# Patient Record
Sex: MALE | Race: WHITE | NOT HISPANIC OR LATINO | Employment: FULL TIME | URBAN - METROPOLITAN AREA
[De-identification: names, ages, dates, MRNs, and addresses within clinical notes are randomized per-mention and may not be internally consistent; named-entity substitution may affect disease eponyms.]

---

## 2017-08-21 ENCOUNTER — OFFICE VISIT (OUTPATIENT)
Dept: FAMILY MEDICINE | Facility: CLINIC | Age: 57
End: 2017-08-21

## 2017-08-21 VITALS
DIASTOLIC BLOOD PRESSURE: 89 MMHG | HEART RATE: 70 BPM | WEIGHT: 229.2 LBS | HEIGHT: 74 IN | TEMPERATURE: 97.9 F | BODY MASS INDEX: 29.41 KG/M2 | SYSTOLIC BLOOD PRESSURE: 135 MMHG | OXYGEN SATURATION: 99 %

## 2017-08-21 DIAGNOSIS — Z00.00 ROUTINE GENERAL MEDICAL EXAMINATION AT A HEALTH CARE FACILITY: Primary | ICD-10-CM

## 2017-08-21 LAB
CHOLEST SERPL-MCNC: 243 MG/DL
CHOLEST/HDLC SERPL: 3.5 RATIO
HDLC SERPL-MCNC: 70 MG/DL
LDLC SERPL CALC-MCNC: 136 MG/DL (ref 0–99)
TRIGL SERPL-MCNC: 184 MG/DL
VLDL-CHOLESTEROL: 37 MG/DL (ref 7–32)

## 2017-08-21 NOTE — MR AVS SNAPSHOT
"              After Visit Summary   8/21/2017    Joe Copeland    MRN: [id number]           Patient Information     Date Of Birth          1960        Visit Information        Provider Department      8/21/2017 8:00 AM Daljit Ryan MD [facility]        Today's Diagnoses     Routine general medical examination at a health care facility    -  1       Follow-ups after your visit        Who to contact     Please call your clinic at [phone] to:    Ask questions about your health    Make or cancel appointments    Discuss your medicines    Learn about your test results    Speak to your doctor   If you have compliments or concerns about an experience at your clinic, or if you wish to file a complaint, please contact [facility] Patient Relations at [phone] or email us at [email or web address]         Additional Information About Your Visit        Care EveryWhere ID     This is your Care EveryWhere ID. This could be used by other organizations to access your [place] medical records  [id number]        Your Vitals Were     Pulse Temperature Height Pulse Oximetry BMI (Body Mass Index)       70 97.9  F (36.6  C) (Oral) 6' 2.25\" (188.6 cm) 99% 29.23 kg/m2        Blood Pressure from Last 3 Encounters:   08/21/17 135/89   09/28/16 137/90    Weight from Last 3 Encounters:   08/21/17 229 lb 3.2 oz (104 kg)   09/28/16 229 lb 3.2 oz (104 kg)   03/06/12 220 lb (99.8 kg)              We Performed the Following     [facility] - Results < 1 hr        Primary Care Provider Office Phone # Fax #    Daljit Ryan [phone]       [facility] [address] [facility] [address]        Equal Access to Services     [facility] HospitalSHARIF AH: Hadii aad brianna acharya Sogeovanna, waaxda luqadaha, qaybta kaalmada naomyyada, laurie cordova. So [facility] [phone].    ATENCIÓN: Si habla español, tiene a gar disposición servicios " gilbert de asistencia lingüística. Alicia ray [phone].    We comply with applicable federal civil rights laws and [place] laws. We do not discriminate on the basis of race, color, national origin, age, disability sex, sexual orientation or gender identity.            Thank you!     Thank you for choosing [facility]  for your care. Our goal is always to provide you with excellent care. Hearing back from our patients is one way we can continue to improve our services. Please take a few minutes to complete the written survey that you may receive in the mail after your visit with us. Thank you!             Your Updated Medication List - Protect others around you: Learn how to safely use, store and throw away your medicines at www.disposemymeds.org.          This list is accurate as of: 8/21/17  7:34 PM.  Always use your most recent med list.                   Brand Name Dispense Instructions for use Diagnosis    ADVIL 200 MG tablet   Generic drug:  ibuprofen      Take 600 mg by mouth as needed.        cetirizine 10 MG tablet    zyrTEC    90 tablet    Take 1 tablet (10 mg) by mouth every evening    Seasonal allergic rhinitis       fluticasone 50 MCG/ACT spray    FLONASE    1 Bottle    Spray 1-2 sprays into both nostrils daily    Seasonal allergic rhinitis       omeprazole 20 MG CR capsule    priLOSEC    90 capsule    Take 1 capsule (20 mg) by mouth daily    Gastroesophageal reflux disease without esophagitis

## 2017-08-21 NOTE — PROGRESS NOTES
PREVENTIVE HEALTH VISIT   HEALTH CONCERNS TODAY:   1.  Gastroesophageal reflux disease.   2.  Mole.   3.  Vascular risk assessment.   HISTORY OF PRESENT ILLNESS:  This is a 56-year-old male who presents for health maintenance.  As far as his reflux he has been using omeprazole on a daily basis.  With the use of omeprazole he has not had heartburn or a hoarse voice that was bothering him in the past before he started the medication.  He has tried stopping the medication for brief periods over the past couple months to find that his symptoms returned.  He has not had blood in stool, black stool or other symptoms.  No difficulty with swallowing or food getting stuck.   He has a mole on his left low back and behind his right triceps which he would like evaluated.   REVIEW OF SYSTEMS:  No fevers or chills.  His weight is stable.  He feels well.  He has occasional stuffy nose or seasonal allergies and mild sensation of plugging in his right ear which is improving.  No vision or other urinary complaints.  No chest pain, palpitations, no dizziness, lightheadedness, no shortness of breath, no change in his exercise tolerance.  No abdominal pain.  No change in stools, no change in urination.  Skin;  Mole concerns as outlined above.  He takes minocycline through a dermatologist for rosacea.  The remainder of a complete review of systems is unremarkable.   PAST MEDICAL HISTORY:  None significant.     PAST SURGICAL HISTORY:  None.   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  He is  to Tere.  He has 2 children, Mildred, who is 24 and Danny who is 21.  Mildred is now law school at [employer].  He does not smoke or use drugs.  He exercises regularly.   FAMILY HISTORY:  His mother  at age 85 what he thinks may have been kidney cancer.  She had hypertension.  His father  at age 56 of leukemia.  He is not sure what type.  There is no family history of heart disease, lung disease,  diabetes, colon cancer, prostate cancer, breast cancer.   PHYSICAL EXAMINATION:   VITAL SIGNS:  As above.  Repeat blood pressure is 136/86.   GENERAL:  He is alert, no distress, relates his own history in detail.   HEENT:  Head is normocephalic and atraumatic.  Eyes, sclerae are nonicteric, noninjected.  Extraocular movements are intact.  Tympanic membranes are white.  The right TM has a small effusion at the base, bony and light landmarks are intact.   NECK:  Supple without lymphadenopathy.  Nose exam is unremarkable.  No tonsillar hypertrophy or exudate.   CARDIOVASCULAR:  He has a regular rate and rhythm without murmur.   LUNGS:  Clear to auscultation bilaterally.   ABDOMEN:  Soft, nontender.  There is no hepatosplenomegaly.   EXTREMITIES:  Free of edema.   SKIN:  He has mild central face without erythema.  He has a 1.5 cm stuck-on, light brown appearing lesion at the left low back consistent with seborrheic keratoses.  He has an approximately 1 cm light brown, stuck-on appearing lesion at the left mid triceps consistent with seborrheic keratoses and he has multiple cherry angiomas throughout the back and chest.   ASSESSMENT AND PLAN:   1.  Gastroesophageal reflux disease:  He can continue omeprazole on a daily basis.  He has had return of symptoms with discontinuation, so I would just continue for the next one year, then re-evaluate.  We will try discontinuing after one year.  He knows to avoid NSAIDS, large meals, and other habits which may contribute to reflux.   2.  Seborrheic keratosis:  This may be removed if it becomes bothersome or catches on clothing.  Otherwise, it is benign appearing.  He also follows with the dermatologist.   3.  Rosacea:  He follows with a dermatologist and has an appointment coming up next week.   4.  Health care maintenance:  Lipid profile is pending.  We will calculate 10-year vascular risk when results are available.  We had a colonoscopy about 5 years ago with [facility] in  [place].  He will have a repeat at age 61.  He had a cardiac stress test at [place] 1 year ago.  He has not had any cardiac symptoms over the past year.     10 year AHA risk 5.8%.  Statin and aspirin for prevention not indicated.

## 2017-08-21 NOTE — LETTER
August 23, 2017      Joe Copeland  [address] [place] [address]        Dear Joe,    Your cholesterol is at goal level.  No indication for cholesterol medication or a daily preventative aspirin at this point.     Please see below for your test results.    Resulted Orders   Lipid Panel ([facility]) - Results < 1 hr   Result Value Ref Range    Cholesterol 243.0 (H) <200.0 mg/dL    Triglycerides 184.0 (H) <150.0 mg/dL    HDL Cholesterol 70.0 >40.0 mg/dL      Comment:      If diabetic or CVD then reference range <100    VLDL-Cholesterol 37.0 (H) 7.0 - 32.0 mg/dL    LDL Cholesterol Direct 136.0 (H) 0.0 - 99.0 mg/dL    Cholesterol/HDL Ratio 3.5 <5.0 RATIO       If you have any questions, please call the clinic to make an appointment.    Sincerely,    Daljit Ryan MD

## 2017-08-22 NOTE — PROGRESS NOTES
Please send result letter  Your cholesterol is at goal level.  No indication for cholesterol medication or a daily preventative aspirin at this point.

## 2017-10-16 ENCOUNTER — OFFICE VISIT (OUTPATIENT)
Dept: FAMILY MEDICINE | Facility: CLINIC | Age: 57
End: 2017-10-16

## 2017-10-16 VITALS
HEIGHT: 75 IN | HEART RATE: 62 BPM | BODY MASS INDEX: 28.12 KG/M2 | WEIGHT: 226.2 LBS | SYSTOLIC BLOOD PRESSURE: 129 MMHG | DIASTOLIC BLOOD PRESSURE: 83 MMHG | OXYGEN SATURATION: 98 % | TEMPERATURE: 99 F

## 2017-10-16 DIAGNOSIS — Z23 NEED FOR PROPHYLACTIC VACCINATION AND INOCULATION AGAINST INFLUENZA: ICD-10-CM

## 2017-10-16 DIAGNOSIS — B34.9 VIRAL SYNDROME: Primary | ICD-10-CM

## 2017-10-16 DIAGNOSIS — R05.9 COUGH: ICD-10-CM

## 2017-10-16 DIAGNOSIS — H10.33 ACUTE CONJUNCTIVITIS OF BOTH EYES, UNSPECIFIED ACUTE CONJUNCTIVITIS TYPE: ICD-10-CM

## 2017-10-16 DIAGNOSIS — R53.83 FATIGUE, UNSPECIFIED TYPE: ICD-10-CM

## 2017-10-16 LAB
% GRANULOCYTES: 78.6 %G (ref 40–75)
GRANULOCYTES #: 10.6 K/UL (ref 1.6–8.3)
HCT VFR BLD AUTO: 46.3 % (ref 40–53)
HEMOGLOBIN: 14.7 G/DL (ref 13.3–17.7)
LYMPHOCYTES # BLD AUTO: 2 K/UL (ref 0.8–5.3)
LYMPHOCYTES NFR BLD AUTO: 14.7 %L (ref 20–48)
MCH RBC QN AUTO: 29.8 PG (ref 26.5–35)
MCHC RBC AUTO-ENTMCNC: 31.7 G/DL (ref 32–36)
MCV RBC AUTO: 93.9 FL (ref 78–100)
MID #: 0.9 K/UL (ref 0–2.2)
MID %: 6.7 %M (ref 0–20)
PLATELET # BLD AUTO: 322 K/UL (ref 150–450)
RBC # BLD AUTO: 4.93 M/UL (ref 4.4–5.9)
WBC # BLD AUTO: 13.5 K/UL (ref 4–11)

## 2017-10-16 RX ORDER — POLYMYXIN B SULFATE AND TRIMETHOPRIM 1; 10000 MG/ML; [USP'U]/ML
1 SOLUTION OPHTHALMIC 4 TIMES DAILY
Qty: 2 ML | Refills: 0 | Status: SHIPPED | OUTPATIENT
Start: 2017-10-16 | End: 2017-10-23

## 2017-10-16 NOTE — PROGRESS NOTES
Please send result letter  As we discussed by phone, your white blood cell count is somewhat elevated due to inflammation/infection.  Your white blood cell count level does not show any sign of a blood cell production problems or any problems fighting infection.  Your chest x-ray was normal.  I suspect your infection is viral, possibilities include adenovirus, influenza, parainfluenza; unfortunately there is no quick cure for any of these, and I anticipate your symptoms will be significantly better in 7 days, and your cough to be fully resolved in 3 weeks.  If you are not getting better as expected, or develop new symptoms, please feel free to call me.

## 2017-10-16 NOTE — LETTER
October 17, 2017      Joe Copeland  [address] [place] [address]        Dear Joe,    As we discussed by phone, your white blood cell count is somewhat elevated due to inflammation/infection.  Your white blood cell count level does not show any sign of a blood cell production problems or any problems fighting infection.  Your chest x-ray was normal.  I suspect your infection is viral, possibilities include adenovirus, influenza, parainfluenza; unfortunately there is no quick cure for any of these, and I anticipate your symptoms will be significantly better in 7 days, and your cough to be fully resolved in 3 weeks.  If you are not getting better as expected, or develop new symptoms, please feel free to call me.     Please see below for your test results.    Resulted Orders   CBC with Diff Plt ([facility])   Result Value Ref Range    WBC 13.5 (H) 4.0 - 11.0 K/uL    Lymphocytes # 2.0 0.8 - 5.3 K/uL    % Lymphocytes 14.7 (L) 20.0 - 48.0 %L    Mid # 0.9 0.0 - 2.2 K/uL    Mid % 6.7 0.0 - 20.0 %M    GRANULOCYTES # 10.6 (H) 1.6 - 8.3 K/uL    % Granulocytes 78.6 (H) 40.0 - 75.0 %G    RBC 4.93 4.40 - 5.90 M/uL    Hemoglobin 14.7 13.3 - 17.7 g/dL    Hematocrit 46.3 40.0 - 53.0 %    MCV 93.9 78.0 - 100.0 fL    MCH 29.8 26.5 - 35.0 pg    MCHC 31.7 (L) 32.0 - 36.0 g/dL    Platelets 322.0 150.0 - 450.0 K/uL       If you have any questions, please call the clinic to make an appointment.    Sincerely,    Daljit Ryan MD

## 2017-12-14 DIAGNOSIS — K21.9 GASTROESOPHAGEAL REFLUX DISEASE WITHOUT ESOPHAGITIS: ICD-10-CM

## 2018-08-06 ENCOUNTER — TRANSFERRED RECORDS (OUTPATIENT)
Dept: HEALTH INFORMATION MANAGEMENT | Facility: CLINIC | Age: 58
End: 2018-08-06

## 2018-08-06 ENCOUNTER — OFFICE VISIT (OUTPATIENT)
Dept: FAMILY MEDICINE | Facility: CLINIC | Age: 58
End: 2018-08-06
Payer: COMMERCIAL

## 2018-08-06 VITALS
OXYGEN SATURATION: 98 % | HEIGHT: 74 IN | HEART RATE: 79 BPM | SYSTOLIC BLOOD PRESSURE: 123 MMHG | TEMPERATURE: 98.1 F | BODY MASS INDEX: 28.83 KG/M2 | WEIGHT: 224.6 LBS | DIASTOLIC BLOOD PRESSURE: 84 MMHG

## 2018-08-06 DIAGNOSIS — R00.2 PALPITATIONS: ICD-10-CM

## 2018-08-06 DIAGNOSIS — R53.83 FATIGUE, UNSPECIFIED TYPE: ICD-10-CM

## 2018-08-06 DIAGNOSIS — I48.4 ATYPICAL ATRIAL FLUTTER (H): Primary | ICD-10-CM

## 2018-08-06 NOTE — MR AVS SNAPSHOT
"              After Visit Summary   8/6/2018    Joe Copeland    MRN: [id number]           Patient Information     Date Of Birth          1960        Visit Information        Provider Department      8/6/2018 11:20 AM Daljit Ryan MD [facility]        Today's Diagnoses     Atypical atrial flutter (H)    -  1    Palpitations        Fatigue, unspecified type           Follow-ups after your visit        Who to contact     Please call your clinic at [phone] to:    Ask questions about your health    Make or cancel appointments    Discuss your medicines    Learn about your test results    Speak to your doctor            Additional Information About Your Visit        Care EveryWhere ID     This is your Care EveryWhere ID. This could be used by other organizations to access your [place] medical records  [id number]        Your Vitals Were     Pulse Temperature Height Pulse Oximetry BMI (Body Mass Index)       79 98.1  F (36.7  C) (Oral) 6' 2.41\" (189 cm) 98% 28.52 kg/m2        Blood Pressure from Last 3 Encounters:   08/06/18 123/84   10/16/17 129/83   08/21/17 135/89    Weight from Last 3 Encounters:   08/06/18 224 lb 9.6 oz (101.9 kg)   10/16/17 226 lb 3.2 oz (102.6 kg)   08/21/17 229 lb 3.2 oz (104 kg)              We Performed the Following     EKG 12-lead complete w/read - Clinics          Today's Medication Changes          These changes are accurate as of 8/6/18 10:41 PM.  If you have any questions, ask your nurse or doctor.               Stop taking these medicines if you haven't already. Please contact your care team if you have questions.     omeprazole 20 MG CR capsule   Commonly known as:  priLOSEC   Stopped by:  Daljit Ryan MD                    Primary Care Provider Office Phone # Fax #    Daljit Ryan [phone]       [facility] [id number]        Equal Access to Services     JAQUAN SHRESTHA AH: sheela Power, " sidraanette begumyaniramarion moralezkristineroger schultzke maxjose a cordova. So [facility] [phone].    ATENCIÓN: Si brenda witt, tiene a gar disposición servicios gratuitos de asistencia lingüística. lAicia al [phone].    We comply with applicable federal civil rights laws and [place] laws. We do not discriminate on the basis of race, color, national origin, age, disability, sex, sexual orientation, or gender identity.            Thank you!     Thank you for choosing [facility]  for your care. Our goal is always to provide you with excellent care. Hearing back from our patients is one way we can continue to improve our services. Please take a few minutes to complete the written survey that you may receive in the mail after your visit with us. Thank you!             Your Updated Medication List - Protect others around you: Learn how to safely use, store and throw away your medicines at www.disposemymeds.org.          This list is accurate as of 8/6/18 10:41 PM.  Always use your most recent med list.                   Brand Name Dispense Instructions for use Diagnosis    ADVIL 200 MG tablet   Generic drug:  ibuprofen      Take 600 mg by mouth as needed.        cetirizine 10 MG tablet    zyrTEC    90 tablet    Take 1 tablet (10 mg) by mouth every evening    Seasonal allergic rhinitis       fluticasone 50 MCG/ACT spray    FLONASE    1 Bottle    Spray 1-2 sprays into both nostrils daily    Seasonal allergic rhinitis

## 2018-08-07 NOTE — PROGRESS NOTES
"Subjective: 57-year-old male presents with episodes of heart racing, pressure, and dyspnea.  He is not having symptoms right now.  He cannot recall when the symptoms first started.  He can recall a number of years ago having similar symptoms and being evaluated with a cardiac stress test at [facility].  The cardiac stress test was reported to be normal.      Now sometime over the past few months he has developed episodes where he will have the same sensation of a racing heart, feeling pressure sensation, feeling short of breath.  \"I think I am having a heart attack\".  He cannot recall the frequency with which it happens but probably on the order of a couple times per  month.  The symptoms typically last half hour or less.  Do not do do not seem to change with exertion up.  They seem better if I \"ignore them and just go about my business\".  He was bicycling about 2 weeks ago and felt so fatigued that he actually called his wife for a ride home.  He has never done that before.  He felt fine by the time he got picked up by his wife.  He has been attributing these symptoms to stress.  He transitioned to a new job recently.  His brother  in May.  His wife's father and brother  recently.  His 23-year-old sons best friend  at age 23 of suicide 2 weeks ago.  He went for a 3-1/2 hour mountain bike ride yesterday and did not have any symptoms during the ride.    Review of systems: Currently not short of breath, no chest pain.  No orthopnea.  No leg swelling.  He has felt in general fatigued over the past month to month and a half.  He recalls having one dizzy episode as he was walking into a event that spontaneously resolved.  He has occasional muscle aches particularly in bilateral biceps and legs.  He occasionally wakes up at night.  He is stressed recently with all the life stressors in the recent deaths.  He occasionally gets a tingling sensation in his left wrist after biking for prolonged periods of " time    Past medical history:  Negative cardiac stress test approximately 2 years ago at [facility] per patient report    Social history: Non-smoker.  Occasional alcohol.  .    Exam: Vitals are as above except heart rate is about 130 on my exam  General: He is alert and relates his own history  HEENT: Head is normocephalic.  Sclera nonicteric.  Moist mucous membranes  Cardiovascular: Heart tones are irregular with a heart rate of about 130 he has a strong bilateral radial pulses  No jugular venous distention  Respiratory: Lungs are clear to station bilaterally  Abdomen: Soft nontender  Extremities Free of edema    EKG: Shows atrial flutter with a heart rate of 131.  There is some slight upsloping of the ST segments in the lateral leads otherwise no ST or T-wave changes.    Assessment and plan  1) atrial flutter: This may be intermittent and his symptoms of palpitations fatigue and dyspnea on and off over the past number of months may be due to this arrhythmia.  He has good functional capacity, fairly recent negative cardiac stress test, but I recommend a prompt evaluation.  I referred him to the emergency department of his choice [facility].  I anticipate he will have an etiology workup including a ischemic workup, rate control, and anticoagulation of.  They will likely discuss cardioversion with him.  I called and spoke with the accepting emergency department physician at [place].  As he was asymptomatic and stable he was transported by his wife to the emergency department and I confirmed his safe arrival and admission to the hospital.  2) significant life stressors: We discussed the need for some healthy coping strategies as he navigates is very stressful time in his life which we will further discuss after managing his acute cardiac arrhythmia.

## 2018-08-08 ENCOUNTER — TRANSFERRED RECORDS (OUTPATIENT)
Dept: HEALTH INFORMATION MANAGEMENT | Facility: CLINIC | Age: 58
End: 2018-08-08

## 2018-08-31 ENCOUNTER — TRANSFERRED RECORDS (OUTPATIENT)
Dept: HEALTH INFORMATION MANAGEMENT | Facility: CLINIC | Age: 58
End: 2018-08-31

## 2019-01-09 ENCOUNTER — TELEPHONE (OUTPATIENT)
Dept: FAMILY MEDICINE | Facility: CLINIC | Age: 59
End: 2019-01-09

## 2019-01-09 DIAGNOSIS — Z23 NEED FOR PROPHYLACTIC VACCINATION WITH TYPHOID-PARATYPHOID ALONE (TAB): ICD-10-CM

## 2019-01-09 DIAGNOSIS — Z71.84 COUNSELING ABOUT TRAVEL: Primary | ICD-10-CM

## 2019-01-09 NOTE — TELEPHONE ENCOUNTER
Called patient, He is scheduled for a nurse visit on Monday 1/14/2019 at 10:00 but will be coming in around 9:30AM. Pt will be getting Tdap, Hep A, Hep B, and influenza.

## 2019-01-09 NOTE — TELEPHONE ENCOUNTER
Patient called to report traveling to [place] in early February 2019, would like immunization advice.    I advised patient to review [facility] website to find immunization and travel/health recommendations for his area of planned travel.    I recommend he get a Tdap booster, influenza vaccine, start the hepatitis A and hepatitis B vaccine series.    Recommend oral typhoid vaccine (vivotif), and a prescription was sent to his pharmacy via EMR.    Patient reports the malaria risk in his planned area of travel as low, and that he is not planning camping, outdoor adventures, etc.  He declines malaria chemoprohylaxis after reviewing the [facility] website and our discussion.    I recommend he get the shingles vaccine, Shingrix, following his travel.    I will ask MA staff to contact patient to contact patient to give advice on how best to get these vaccines and arrange a vaccine appointment for TdaP, Hepatitis A, Hepatitis B, and influenza vaccine prior to travel.  This can be done at clinic, pharmacy, or combonation.    Oral typhoid vaccine has been sent to patient's pharmacy.    Message routed to Norma Ryan

## 2019-01-14 ENCOUNTER — ALLIED HEALTH/NURSE VISIT (OUTPATIENT)
Dept: FAMILY MEDICINE | Facility: CLINIC | Age: 59
End: 2019-01-14
Payer: COMMERCIAL

## 2019-01-14 VITALS
HEART RATE: 68 BPM | HEIGHT: 75 IN | WEIGHT: 227.25 LBS | DIASTOLIC BLOOD PRESSURE: 91 MMHG | BODY MASS INDEX: 28.26 KG/M2 | TEMPERATURE: 97.5 F | OXYGEN SATURATION: 100 % | SYSTOLIC BLOOD PRESSURE: 136 MMHG

## 2019-01-14 DIAGNOSIS — Z23 IMMUNIZATION DUE: Primary | ICD-10-CM

## 2019-01-14 ASSESSMENT — MIFFLIN-ST. JEOR: SCORE: 1944.55

## 2019-01-14 NOTE — NURSING NOTE
Injectable influenza vaccine documentation    1. Has the patient received the information for the influenza vaccine? YES    2. Does the patient have a severe allergy to eggs (Patients with a severe egg allergy should be assessed by a medical provider, RN, or clinical pharmacist. If they receive the influenza vaccine, please have them observed for 15 minutes.)? No    3. Has the patient had an allergic reaction to previous influenza vaccines? No    4. Has the patient had any severe allergic reactions to past influenza vaccines ? No       5. Does patient have a history of Guillain-[place] syndrome? No      Based on responses above, I administered the influenza vaccine.  Tk Sheikh CMA   Name ordering is DR Ryan  Name preceptor on site during the time of injection is DR Ryan    Vaccine verify with Norma Hicks CMA

## 2019-01-16 ENCOUNTER — TELEPHONE (OUTPATIENT)
Dept: FAMILY MEDICINE | Facility: CLINIC | Age: 59
End: 2019-01-16

## 2019-01-16 NOTE — TELEPHONE ENCOUNTER
Patient called and informed to continue to monitor blood pressure twice weekly with home readings.    See me in clinic in 4 weeks  if BP consisently above 140/90.    Reduce salt diet, regular exercise, reduce alcohol and caffeine.    BEATRIZ Ryan MD

## 2019-01-16 NOTE — TELEPHONE ENCOUNTER
----- Message from Tk Sheikh CMA sent at 1/14/2019 10:21 AM CST -----  Hi DR Ryan,    This patient for here for immunization and his blood pressure.  #1 139/92  #2 136/92   Patient said you can call with his blood pressure result.    Thanks,  Tk

## 2019-03-15 ENCOUNTER — TELEPHONE (OUTPATIENT)
Dept: FAMILY MEDICINE | Facility: CLINIC | Age: 59
End: 2019-03-15

## 2019-03-15 DIAGNOSIS — L71.9 ROSACEA: Primary | ICD-10-CM

## 2019-03-15 RX ORDER — MINOCYCLINE HYDROCHLORIDE 50 MG/1
50 CAPSULE ORAL 2 TIMES DAILY
Qty: 60 CAPSULE | Refills: 0 | Status: SHIPPED | OUTPATIENT
Start: 2019-03-15 | End: 2019-08-02

## 2019-03-16 NOTE — TELEPHONE ENCOUNTER
Patient usual routine for rosacea is minocycline 50mg BID.  Reviewed risk of sun sensitivity, GI upset, and more serious reactions like skin reactions, lupus like reactions, and allergic reactions.    Recommend minocycline 50mg BID until current flare improves, (next 10-14 days) then transition to metro gel 0.75%.topically daily. Discussed risk of skin irritation, allergic reaction.    Rx sent to pharmacy.    F/U in clinic if not improving in 2 weeks, otherwise f/u for annual physical at next convenience

## 2019-05-01 ENCOUNTER — ALLIED HEALTH/NURSE VISIT (OUTPATIENT)
Dept: FAMILY MEDICINE | Facility: CLINIC | Age: 59
End: 2019-05-01
Payer: COMMERCIAL

## 2019-05-01 VITALS
DIASTOLIC BLOOD PRESSURE: 99 MMHG | HEIGHT: 74 IN | TEMPERATURE: 98.1 F | WEIGHT: 226.5 LBS | SYSTOLIC BLOOD PRESSURE: 151 MMHG | OXYGEN SATURATION: 97 % | BODY MASS INDEX: 29.07 KG/M2 | HEART RATE: 64 BPM

## 2019-05-01 DIAGNOSIS — Z23 IMMUNIZATION DUE: Primary | ICD-10-CM

## 2019-05-01 ASSESSMENT — MIFFLIN-ST. JEOR: SCORE: 1922.4

## 2019-05-01 NOTE — NURSING NOTE
Here for Hep B #2   Blood pressure # 1 151/99 # 2 150/96 I did review these blood pressure result with DR Brito and was instructed by DR Brito for patient to schedule appointment with DR Ryan to recheck blood pressure.Patient stated that he is  going out of town and when he return home he will call and schedule appointment.

## 2019-08-02 DIAGNOSIS — L71.9 ROSACEA: ICD-10-CM

## 2019-08-05 RX ORDER — MINOCYCLINE HYDROCHLORIDE 50 MG/1
50 CAPSULE ORAL 2 TIMES DAILY
Qty: 60 CAPSULE | Refills: 3 | Status: SHIPPED | OUTPATIENT
Start: 2019-08-05 | End: 2020-10-16

## 2019-10-03 ENCOUNTER — OFFICE VISIT (OUTPATIENT)
Dept: FAMILY MEDICINE | Facility: CLINIC | Age: 59
End: 2019-10-03
Payer: COMMERCIAL

## 2019-10-03 VITALS
BODY MASS INDEX: 28.35 KG/M2 | OXYGEN SATURATION: 100 % | RESPIRATION RATE: 16 BRPM | TEMPERATURE: 98.6 F | HEART RATE: 73 BPM | DIASTOLIC BLOOD PRESSURE: 91 MMHG | HEIGHT: 75 IN | SYSTOLIC BLOOD PRESSURE: 148 MMHG | WEIGHT: 228 LBS

## 2019-10-03 DIAGNOSIS — J02.9 PHARYNGITIS, UNSPECIFIED ETIOLOGY: ICD-10-CM

## 2019-10-03 DIAGNOSIS — I48.0 PAROXYSMAL ATRIAL FIBRILLATION (H): ICD-10-CM

## 2019-10-03 DIAGNOSIS — J30.2 SEASONAL ALLERGIC RHINITIS, UNSPECIFIED TRIGGER: ICD-10-CM

## 2019-10-03 DIAGNOSIS — K21.9 GASTROESOPHAGEAL REFLUX DISEASE WITHOUT ESOPHAGITIS: ICD-10-CM

## 2019-10-03 DIAGNOSIS — Z13.1 SCREENING FOR DIABETES MELLITUS: ICD-10-CM

## 2019-10-03 DIAGNOSIS — R09.82 POST-NASAL DRIP: Primary | ICD-10-CM

## 2019-10-03 DIAGNOSIS — Z13.220 SCREENING FOR LIPID DISORDERS: ICD-10-CM

## 2019-10-03 LAB
CHOLEST SERPL-MCNC: 214 MG/DL
CHOLEST/HDLC SERPL: 3.5 RATIO
HBA1C MFR BLD: 5 % (ref 4.1–5.7)
HDLC SERPL-MCNC: 62 MG/DL
LDLC SERPL CALC-MCNC: 129 MG/DL (ref 0–99)
TRIGL SERPL-MCNC: 116 MG/DL
VLDL-CHOLESTEROL: 23 MG/DL (ref 7–32)

## 2019-10-03 RX ORDER — CETIRIZINE HYDROCHLORIDE 10 MG/1
10 TABLET ORAL EVERY EVENING
Qty: 90 TABLET | Refills: 1 | Status: SHIPPED | OUTPATIENT
Start: 2019-10-03 | End: 2023-10-20

## 2019-10-03 RX ORDER — RIVAROXABAN 20 MG/1
TABLET, FILM COATED ORAL
COMMUNITY
Start: 2019-09-13 | End: 2019-11-28

## 2019-10-03 RX ORDER — SOTALOL HYDROCHLORIDE 80 MG/1
40 TABLET ORAL
COMMUNITY
Start: 2019-09-24 | End: 2019-11-25

## 2019-10-03 RX ORDER — FLUTICASONE PROPIONATE 50 MCG
1-2 SPRAY, SUSPENSION (ML) NASAL DAILY
Qty: 11.1 ML | Refills: 3 | Status: SHIPPED | OUTPATIENT
Start: 2019-10-03

## 2019-10-03 ASSESSMENT — MIFFLIN-ST. JEOR: SCORE: 1939.83

## 2019-10-03 NOTE — LETTER
"October 7, 2019      Joe Copeland  [address] [facility]  [place] [address]        Dear Joe,    Your screening test for diabetes was NORMAL.   Your cholesterol level is slightly improved compared to 2 years ago.  In order to help prevent heart disease and stroke, it is important to reduce your blood pressure to 130/80 (ideally under 120/80).  You can start by trying a \"DASH\" diet, which could help to significantly reduce your blood pressure (by about 10 points), reduce alcohol and caffeine, and continue your regular exercise.  You should seriously consider blood pressure medication if your blood pressure is not consistently 140/80 or less 6-8 weeks after making these changes.   Current guidelines recommend cholesterol medication for individuals with a 10 year vascular risk of 7.5% or more, your current risk (with elevated blood pressure) is 8.5%.  You should work to reduce your blood pressure, and can also consider a cholesterol medication.   Return to see Dr. Ryan in the next few weeks to recheck your blood pressure after making diet changes and discuss your options.     Please see below for your test results.    Resulted Orders   Lipid Panel ([facility]) - Results < 1 hr   Result Value Ref Range    Cholesterol 214.0 (H) <200.0 mg/dL    Triglycerides 116.0 <150.0 mg/dL    HDL Cholesterol 62.0 >40.0 mg/dL      Comment:      If diabetic or CVD then reference range <100    VLDL-Cholesterol 23.0 7.0 - 32.0 mg/dL    LDL Cholesterol Direct 129.0 (H) 0.0 - 99.0 mg/dL    Cholesterol/HDL Ratio 3.5 <5.0 RATIO   Hemoglobin A1c ([facility])   Result Value Ref Range    Hemoglobin A1C 5.0 4.1 - 5.7 %       If you have any questions, please call the clinic to make an appointment.    Sincerely,    Daljit Ryan MD  "

## 2019-10-03 NOTE — PATIENT INSTRUCTIONS
Take omeprazole 20 mg each day in the morning regularly for the next 4 weeks    Use the fluticasone nasal steroid 2 sprays to each nostril carefully as demonstrated in clinic each day for the next 4 weeks    Take Zyrtec allergy medication 10 mg each day for the next 4 weeks    If your sore throat, clearing throat, and cough have not resolved in the next 3 to 4 weeks please return for reevaluation    Remember to get your shingles vaccines through your pharmacy    Your cholesterol and diabetes screening test results will be available soon

## 2019-10-03 NOTE — PROGRESS NOTES
History  59-year-old male presents with:  2 months of sore throat, clear runny nose, intermittent hoarseness and 3 days of cough.  Starting in August he noticed sore throat that bothers him for about a week at a time, resolves for a few days then reoccurs.  No fevers or chills.  No ill contacts.  No difficulty with swallowing.  No pain with swallowing.   Nonproductive dry cough over the past 3 days.  He has not been using nasal steroids recently, he does use these during the spring allergy season.  He has intermittent heartburn symptoms, not currently using a PPI.     He has a history of paroxysmal and symptomatic atrial fibrillation/flutter.  He underwent  flutter ablation August 8, 2018.  He continues on sotalol 40 mg twice a day under the care of cardiology.  He has not had any symptomatic episodes or palpitations over the course of the summer.  He has been able to exercise vigorously and do mountain bike races without symptoms.    He has not been monitoring his blood pressure outside of the clinic.  Blood pressure was above goal his last 3 visits.        BP Readings from Last 3 Encounters:   10/03/19 (!) 148/91   05/01/19 (!) 151/99   01/14/19 (!) 136/91     Review of systems:  General: Weight is stable  HEENT: No visual changes.  No hearing changes  No facial pain  Cardiovascular: No palpitations, chest pain, racing heart  Respiratory: No shortness of breath  GI: No blood in stool or black stools  Endo: No polyuria, polydipsia  Skin: Has many moles, but none changing or symptomatic    Exam  Vitals are reviewed with blood pressure above goal  General: Alert no distress  HEENT: Head is neuropsych and Intermedic.  Sclera nonicteric noninjected.  Hent membranes right normal bony light landmarks.  Swollen inferior nasal turbinates with clear rhinorrhea.  No tenderness over the frontal and maxillary sinuses.  No tonsillar hypertrophy or exudate.  Mild erythema the posterior oropharynx.  Neck is free of  adenopathy  Cardiovascular: Regular rate and rhythm without murmur  Respiratory: Clear bilaterally  Abdomen is soft and nontender.  No epigastric tenderness  Extremities are free of edema  Skin: He has multiple cherry angiomas and seborrheic keratoses on the skin of his back, chest, and abdomen    Assessment and plan  1) chronic intermittent pharyngitis with symptoms of GERD and rhitinis: Differential diagnosis includes less likely gastroesophageal reflux, postnasal drip, allergic rhinitis, chronic rhinitis, and less likely infectious or other etiologies.    Trial of daily omeprazole, nasal steroid, and nonsedating antihistamine.  Avoid NSAIDs, alcohol, large meals, eating before bedtime, and other habits which exacerbate reflux.  If symptoms have not resolved within the next 3 weeks recommend reevaluation.  If symptoms resolve, recommend discontinuing treatment course after 4 weeks and monitor for recurrence.    2) Paroxysmal atrial fibrillation: Chads 2 Vasc score is O, but would be 1 due to hypertensive blood pressure readings. He is currently on Xarelto.  Follow-up with cardiology, as of last November plan was to sandy [employer] [place] or 14 day ACT monitor to determine if he is having episodes of a-fib.    3) high blood pressure readings: Recommend a 4-week trial of DASH diet, reduce caffeine, reduce alcohol.  Blood pressure not at goal of 130/80 or less recommend initiating antihypertensive treatment likely with lisinopril or losartan.    4) HCM:     Screening for diabetes with A1c normal at 5.0% today.  Lipid profile: Total Cholesterol 214, HDL 62,   10 year cardiac risk is 8.5%.  Recommend he consider daily statin medication.  He is due for repeat colonoscopy with [facility] at age 61.  Referred to his pharmacy for zoster vaccines and influenza  vaccine.

## 2019-10-10 ENCOUNTER — TELEPHONE (OUTPATIENT)
Dept: FAMILY MEDICINE | Facility: CLINIC | Age: 59
End: 2019-10-10

## 2019-10-10 DIAGNOSIS — H10.33 ACUTE CONJUNCTIVITIS OF BOTH EYES, UNSPECIFIED ACUTE CONJUNCTIVITIS TYPE: Primary | ICD-10-CM

## 2019-10-10 RX ORDER — OFLOXACIN 3 MG/ML
2 SOLUTION/ DROPS OPHTHALMIC 4 TIMES DAILY
Qty: 3 ML | Refills: 0 | Status: SHIPPED | OUTPATIENT
Start: 2019-10-10 | End: 2019-10-17

## 2019-10-10 NOTE — TELEPHONE ENCOUNTER
"Patient called with bilateral eye redness and discharge started this morning.  Had a scant amount of drainage past two mornings which wasn't bothersome, but this morning eyes became red and discharge noticeably worse.  Discharge is thin, yellow white mucus.  No vision change.   Looked up \"pink eye\" and feels he has pink eye.  Eyes \"irritated\"  but no pain.  No significant itch.  Able to open and use both eyes comfortably.   No vision changes or double vision.  No trauma to eyes or head.  No headache.  Wears contacts often, extended wear, monthly disposables.  Took his contacts out at noon today due to the redness discharge.    Has had eye irritation in the past from lake water, which included redness, but less discharge, lasting a couple days and resolving spontaneously.  Did swim briefly in a lake 5 days ago.    I discussed with the patient that a definitive diagnosis can only be made with an eye exam, and that there are some conditions which can become quickly vision threatening (keratitis), corneal ulceration. DDx includes these possibilities, as well as infection and allergic conjunctivitis.  As patient has no vision change, no eye pain, minimal itch, 3 days of progressive thin yellow discharge, viral or bacterial (non gonococcal) conjunctivitis most likely.  Patient defers an eye exam at this time, is open to starting fluroquinolone eye drops due to recent contact lens use and possibility of infection.  Ofloxacin 0.3% 2 drops to each eye four times daily x7 days  Stop contact lens use until eyes back to white and no discharge x24 hours.  Throw away current lenses and case.  See eye MD immediately if any vision change or pain, and if not improving by tomorrow afternoon.   Rx sent to pharmacy    BEATRIZ Ryan MD    "

## 2019-11-24 DIAGNOSIS — I10 BENIGN ESSENTIAL HYPERTENSION: Primary | ICD-10-CM

## 2019-11-25 ENCOUNTER — OFFICE VISIT (OUTPATIENT)
Dept: FAMILY MEDICINE | Facility: CLINIC | Age: 59
End: 2019-11-25
Payer: COMMERCIAL

## 2019-11-25 VITALS
OXYGEN SATURATION: 100 % | HEART RATE: 70 BPM | WEIGHT: 228.2 LBS | SYSTOLIC BLOOD PRESSURE: 145 MMHG | BODY MASS INDEX: 29.29 KG/M2 | DIASTOLIC BLOOD PRESSURE: 95 MMHG | TEMPERATURE: 97.6 F | RESPIRATION RATE: 14 BRPM | HEIGHT: 74 IN

## 2019-11-25 DIAGNOSIS — I10 BENIGN ESSENTIAL HYPERTENSION: ICD-10-CM

## 2019-11-25 LAB
BUN SERPL-MCNC: 12 MG/DL (ref 7–30)
CALCIUM SERPL-MCNC: 9.7 MG/DL (ref 8.5–10.4)
CHLORIDE SERPLBLD-SCNC: 102 MMOL/L (ref 94–109)
CO2 SERPL-SCNC: 27 MMOL/L (ref 20–32)
CREAT SERPL-MCNC: 0.8 MG/DL (ref 0.8–1.5)
EGFR CALCULATED (BLACK REFERENCE): >90 ML/MIN
EGFR CALCULATED (NON BLACK REFERENCE): >90 ML/MIN
GLUCOSE SERPL-MCNC: 108 MG/DL (ref 60–109)
POTASSIUM SERPL-SCNC: 4.2 MMOL/L (ref 3.4–5.3)
SODIUM SERPL-SCNC: 142 MMOL/L (ref 133–144)

## 2019-11-25 RX ORDER — SOTALOL HYDROCHLORIDE 80 MG/1
40 TABLET ORAL 2 TIMES DAILY
Qty: 90 TABLET | Refills: 3 | COMMUNITY
Start: 2019-11-25 | End: 2020-01-06

## 2019-11-25 RX ORDER — LISINOPRIL 10 MG/1
10 TABLET ORAL DAILY
Qty: 30 TABLET | Refills: 0 | Status: SHIPPED | OUTPATIENT
Start: 2019-11-25 | End: 2019-12-23

## 2019-11-25 ASSESSMENT — MIFFLIN-ST. JEOR: SCORE: 1931.35

## 2019-11-25 NOTE — PROGRESS NOTES
History    Stopped Xarelto 7 weeks ago under the guidance of cardiology, as he has been in sinus rhythm and no palpitations for at least 6 months.  Checks heart rhythm with phone radha most days to confirm sinus.    Blood pressure has been above goal past few visits, here and at cardiology.  Checks his blood pressure at home with systolics often in the 150s.  In an effort to reduce blood pressure has worked on reducing salt intake, increasing exercise (biking), reducing caffeine and alcohol to reduce blood pressure.  Goal of reducing weight 20 lbs over the course of the next year.    FHx: 1 brother with hypertension  Thinks mother had hypertension  Brother  of stroke, unknown underlying cause    Review of systems   General: No recent illness, No significant change in weight  HEENT: No vision change, or double vision.  No headaches.  No neck pain.  No dizziness or lightheadedness.  Cardiovascular: No chest pain or palpitations  No leg swelling  Respiratory: His dry cough has resolved after a course of omeprazole, and Nasonex.  No longer throat clearing.  Occasional transient loss of voice unchanged for greater than 10 years    Exam   vitals are reviewed with blood pressure above  goal  General: Alert and relates his own history  HEENT: Head is normocephalic and atraumatic.  Eyes sclera nonicteric noninjected.  Tympanic membranes are white with normal bony light landmarks.  Cardia vascular: Regular rate rhythm without murmur  Respiratory: Clear bilaterally  Extremities are free of edema    Assessment and plan  Hypertension: Initiate treatment with lisinopril 10 mg once daily.  We discussed the common side effects such as dry cough, and unlikely but serious potential side effect such as allergic reaction, and angioedema.  Patient consented to treatment.  Basic metabolic profile today and repeat blood pressure check and basic metabolic profile in about 3 weeks.  Goal blood pressure 130/80 or less.  We also discussed  diet modification, avoiding alcohol, and reducing caffeine along with goal weight loss of 20 pounds over the next 1 year.      Initiate Lisinopril 10mg once daily    F/U 3 weeks for BP check and BMP    Daljit Ryan MD

## 2019-11-25 NOTE — PATIENT INSTRUCTIONS
Start Lisinopril 10mg once daily to reduce blood pressure    Blood pressure goal 130/80 or less    Return to see Dr. Ryan in about 3 weeks for a repeat blood pressure check and kidney function/potassium level

## 2019-12-23 DIAGNOSIS — I10 BENIGN ESSENTIAL HYPERTENSION: ICD-10-CM

## 2019-12-23 RX ORDER — LISINOPRIL 10 MG/1
10 TABLET ORAL DAILY
Qty: 30 TABLET | Refills: 0 | Status: SHIPPED | OUTPATIENT
Start: 2019-12-23 | End: 2020-01-22

## 2019-12-23 NOTE — TELEPHONE ENCOUNTER
Patient request refill of Lisinopril 10mg daily.  No side effects reported.    Agree to follow up appointment for BMP and BP check in early January 2020.      Rx refilled.    Daljit Ryan MD

## 2020-01-06 ENCOUNTER — OFFICE VISIT (OUTPATIENT)
Dept: FAMILY MEDICINE | Facility: CLINIC | Age: 60
End: 2020-01-06
Payer: COMMERCIAL

## 2020-01-06 VITALS
WEIGHT: 234.3 LBS | SYSTOLIC BLOOD PRESSURE: 125 MMHG | BODY MASS INDEX: 28.53 KG/M2 | HEIGHT: 76 IN | TEMPERATURE: 97.7 F | DIASTOLIC BLOOD PRESSURE: 85 MMHG | OXYGEN SATURATION: 99 % | HEART RATE: 62 BPM | RESPIRATION RATE: 16 BRPM

## 2020-01-06 DIAGNOSIS — Z86.79 HISTORY OF ATRIAL FIBRILLATION: ICD-10-CM

## 2020-01-06 DIAGNOSIS — I10 BENIGN ESSENTIAL HYPERTENSION: Primary | ICD-10-CM

## 2020-01-06 LAB
BUN SERPL-MCNC: 14 MG/DL (ref 7–30)
CALCIUM SERPL-MCNC: 9.4 MG/DL (ref 8.5–10.4)
CHLORIDE SERPLBLD-SCNC: 107 MMOL/L (ref 94–109)
CO2 SERPL-SCNC: 27 MMOL/L (ref 20–32)
CREAT SERPL-MCNC: 0.8 MG/DL (ref 0.8–1.5)
EGFR CALCULATED (BLACK REFERENCE): >90 ML/MIN
EGFR CALCULATED (NON BLACK REFERENCE): >90 ML/MIN
GLUCOSE SERPL-MCNC: 116 MG/DL (ref 60–109)
POTASSIUM SERPL-SCNC: 4.4 MMOL/L (ref 3.4–5.3)
SODIUM SERPL-SCNC: 141 MMOL/L (ref 133–144)

## 2020-01-06 RX ORDER — SOTALOL HYDROCHLORIDE 80 MG/1
40 TABLET ORAL 2 TIMES DAILY
Qty: 90 TABLET | Refills: 3 | Status: SHIPPED | OUTPATIENT
Start: 2020-01-06 | End: 2020-03-02

## 2020-01-06 ASSESSMENT — MIFFLIN-ST. JEOR: SCORE: 1980.9

## 2020-01-12 NOTE — PROGRESS NOTES
History  59-year-old male presents for management of hypertension.      Hypertension: He started lisinopril 10 mg once daily after our visit on 11/25/2019.  He has not had any noted side effects, cough, dizziness, lightheadedness, or orthostatic symptoms.  He monitors his blood pressure at home, and noticed his blood pressure reduce by 15-20 points within a few days of starting lisinopril.  Home systolic blood pressures now often in the 120s.  Occasionally has blood pressure readings in the 140s which seemed to him temporally related to stress.    Atrial fibrillation/atrial flutter: He has had this managed through cardiology.  To briefly review he was admitted to [facility] in August 2018 with atrial flutter after 2 months of intermittent palpitations.  He underwent atrial flutter ablation by Dr. Godfrey Mullen on 8/8/18 during that hospitalization.  Postprocedure he had one episode of atrial flutter, and several episodes of atrial fibrillation, and on follow-up cardiology recommended low-dose sotalol, sleep study, alcohol moderation.  He underwent a cardiac stress test on 8/31/2018 which was normal with a good exercise tolerance.      He has been feeling well without palpitations.  He has been able to exercise vigorously and did the 100 mile mountain bike ride this summer without any symptoms.  He discontinued Xarelto in mid October 2019.  He followed up with cardiology on November 6, 2019 who recommended:  -Continue sotalol 40 mg twice daily  -Continue off Xarelto due to low CHADS VASC2 score of 1 (hypertension)  -Monitor pulse with [employer] phone radha  -If episode of palpitations >24 hours resume Xarelto  -If he develops symptomatic AF consider PVI    He has not had any palpitations, chest pain, shortness of breath, change in exercise tolerance or other symptoms.    Social history: He feels settled in his job, he has been there now over a year.  Non-smoker.  He plans to travel to [facility] in [place] for  for work in the spring.    Review of systems  General: No recent illness  HEENT: Intermittent runny/stuffy nose, attributes to allergies  Respiratory: No shortness of breath.   CV: as above  Remainder the review of systems as outlined above    Exam  Vitals reviewed, blood pressure 125/85, HR 62  General: Alert no distress.  Relates his own history.difficulty  HEENT: Sclera nonicteric noninjected.  Moist mucous membranes without tonsillar particular exudate.  Tympanic membranes are white with normal bony light landmarks.  No cervical adenopathy  Cardiovascular: Regular rate and rhythm without murmur.  Respiratory: Clear bilaterally  Extremities are free of edema    Assessment and plan    Hypertension: Blood pressure at goal of 130/80 or less on most readings at home.  Continue lisinopril 10 mg each day.    Basic metabolic profile results reviewed with patient today in clinic, normal GFR and lytes.  Continue intermittent monitoring of home blood pressure, regular exercise, alcohol reduction, and stress reduction techniques.  Call if systolic blood pressures regularly greater than 130, or diastolics regularly greater than 80 for further medication titration.  Repeat basic metabolic profile no later than January 2021.    Paroxysmal Atrial Fibrillation:  No symptoms for the past year.  Re-evaluated by cardiology 11/6/19  Continue sotalol 40 mg twice daily per cardiology recommendations.  Refill sent.    Other recommendations of cardiology reviewed today.  He does have the [employer] radha on his phone and demonstrated its use for me today in clinic.  -Continue off Xarelto due to low CHADS VASC2 score of 1 (hypertension)  -Monitor pulse with [employer] phone radha, prompt follow up for any arrythmia   -If episode of palpitations >24 hours resume Xarelto and call  -If he develops symptomatic AF consider PVI    Daljit Ryan MD

## 2020-01-22 DIAGNOSIS — I10 BENIGN ESSENTIAL HYPERTENSION: ICD-10-CM

## 2020-01-23 RX ORDER — LISINOPRIL 10 MG/1
10 TABLET ORAL DAILY
Qty: 90 TABLET | Refills: 3 | Status: SHIPPED | OUTPATIENT
Start: 2020-01-23 | End: 2020-03-02

## 2020-02-25 ENCOUNTER — TELEPHONE (OUTPATIENT)
Dept: FAMILY MEDICINE | Facility: CLINIC | Age: 60
End: 2020-02-25

## 2020-02-25 NOTE — TELEPHONE ENCOUNTER
Patient call regarding vaccine status for upcoming trip to [facility] and [place].  Per [facility] only malaria areas are in [facility] or [facility] of [place], patient does not plan to travel to those areas.    He completed oral typhoid vaccine one year ago.    He was advised that he is due for 2nd hepatitis A vaccine and 3rd hepatitis B vaccine, and should call to schedule nurse visit.    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  He mountain bikes, fat tire bikes, swims and  exercises regularly without cardiac symptoms.    He completed cardiac stress testing 8/31/18    [facility] Cardiac MPI Stress Test (08/31/2018):      1. Normal stress echocardiogram without evidence of stress-induced ischemia.      2. No chest pain or EKG abnormalities with exercise.      3. Normal resting LV function (EF 55%) with normal expected increase with exercise (70%) and normal expected decrease in LV cavity      size.  No inducible wall motion abnormalities.      4. Good exercise tolerance. 12.8 METS    I spoke with the on-call physician of the [employer] who reviewed guidelines and risk factors.  As this patient has documented exercise tolerance of 12.8 METS, tolerated past episodes of a-fib/flutter without syncope, and has well controlled hypertension on the same dose of lisinopril for >30 days, he has no medical conditions which are incompatible with diving.    Discuss with patient the cardiovascular stress of diving, including the risk of fluid shifts, pulmonary edema, recurrence of a-fib, and risks/costs of medical emergencies in remote locations or at sea as he makes his decision about diving.    Recommend he review the CDC traveler's website to review all travelers safety and immunization recommendations.     Daljit Ryan MD

## 2020-02-26 NOTE — TELEPHONE ENCOUNTER
Called, breanna on vm. Per task Nurse visit for 2nd hepatitis A and 3rd hepatitis B prior to upcoming travel. See phone note.

## 2020-03-02 ENCOUNTER — OFFICE VISIT (OUTPATIENT)
Dept: FAMILY MEDICINE | Facility: CLINIC | Age: 60
End: 2020-03-02
Payer: COMMERCIAL

## 2020-03-02 VITALS
TEMPERATURE: 97.5 F | HEIGHT: 74 IN | HEART RATE: 66 BPM | RESPIRATION RATE: 16 BRPM | OXYGEN SATURATION: 100 % | WEIGHT: 224 LBS | DIASTOLIC BLOOD PRESSURE: 84 MMHG | BODY MASS INDEX: 28.75 KG/M2 | SYSTOLIC BLOOD PRESSURE: 123 MMHG

## 2020-03-02 DIAGNOSIS — Z71.84 ENCOUNTER FOR COUNSELING FOR TRAVEL: ICD-10-CM

## 2020-03-02 DIAGNOSIS — Z86.79 HISTORY OF ATRIAL FIBRILLATION: ICD-10-CM

## 2020-03-02 DIAGNOSIS — I10 BENIGN ESSENTIAL HYPERTENSION: Primary | ICD-10-CM

## 2020-03-02 RX ORDER — LISINOPRIL 10 MG/1
10 TABLET ORAL DAILY
Qty: 90 TABLET | Refills: 3 | Status: SHIPPED | OUTPATIENT
Start: 2020-03-02 | End: 2021-02-25

## 2020-03-02 RX ORDER — SOTALOL HYDROCHLORIDE 80 MG/1
40 TABLET ORAL 2 TIMES DAILY
Qty: 90 TABLET | Refills: 3 | Status: SHIPPED | OUTPATIENT
Start: 2020-03-02 | End: 2021-02-25

## 2020-03-02 ASSESSMENT — MIFFLIN-ST. JEOR: SCORE: 1909.19

## 2020-03-02 NOTE — PATIENT INSTRUCTIONS
Your blood pressure is well controlled.  Continue your current dose of lisinopril 10mg daily.    A 90 day supply with refills of both lisinopril and sotalol has been sent to your pharmacy    Refer to the CDC website for travelers' recommendations for the areas you will be visiting in [facility] and [place], and stay abreast of travel recommendations daily    You received your second and final hepatis A vaccine today    You received your 3rd and final hepatitis B vaccine today

## 2020-03-02 NOTE — PROGRESS NOTES
History    59-year-old male presents with 3 concerns today.    Hypertension: He has been using lisinopril 10 mg once daily since 11/25/2019.  No dizziness lightheadedness, or orthostatic symptoms.  He usually checks his blood pressure a couple times per week, but in the past 4 days in preparation for this visit he has been checking his blood pressure twice daily with multiple readings.    Home BP readings past 3 days: 119/88,  110/55, 106/57, 120/82, 150/98, 144/90, 131/83, 129/83    No other noted side effect of the medication, no cough.    No chest pain episodes, heart racing, irregular heart rate, or palpitations.    Travel to [place] and [facility]: He is leaving in 1 week for 2-week trip to [place] in [facility].  He is not visiting any malaria risk areas.  This was originally a work trip, but the work portion was canceled and he is traveling for pleasure.  He will be traveling with his wife.  He will be staying in developed areas with friends.    Assessment for scuba diving: He is a certified .  He has a form with him today for a signature which attest that he does not have any medical conditions which preclude diving.  He has never had any problems with diving the past.  He continues to exercise vigorously.  He was just skiing in [place] at altitude without any difficulty.  He does vigorous exercise 3-4 times per week.  He works with a .  He is able to mountain bike and fat tire bike without palpitations, shortness of breath, chest pain or other symptoms.    Review of systems:  General no recent illness.  No fevers or chills  HEENT: No upper respiratory symptoms  Respiratory: No cough  Cardiovascular: Tolerating vigorous exercise without symptoms.  No peripheral swelling.    Exam  Vitals are reviewed with blood pressure at goal  General: Is alert no distress.  Relates his own detailed history without difficulty  HEENT: Head is normocephalic.  Sclera nonicteric and noninjected.   He has moist mucous membranes.  Tympanic membranes are white with normal bony light landmarks.  Cardiovascular: Regular rate rhythm without murmur  Respiratory: Lungs are clear to auscultation bilaterally  Extremities: Free of peripheral edema    Assessment and plan  Hypertension:   Well controlled by home readings  Continue lisinopril 10 mg each day.  Continue intermittent monitoring of home blood pressure, regular exercise, alcohol moderation, and stress management techniques.  Repeat basic metabolic profile no later than January 2021    Travel counseling: He will not be traveling to any malaria risk areas.  He is up-to-date on typhoid vaccine.  He is up-to-date on influenza vaccine.  He received hepatitis a #2 vaccine, hepatitis B #3 vaccine today  He was referred to the [facility] website for further travel counseling guidance  He was advised to keep up-to-date daily on travel recommendations in light of the coronavirus effect on travel advice    Assessment for scuba diving: We discussed that scuba diving does place a significant cardiovascular demand.  He had a cardiac stress test August 2018 with a good exercise tolerance of 12.8 METS.  He is able to exercise regularly and vigorously without symptoms.  I had consulted with the divers alert network last week when the patient contacted me with questions about fitness for diving, see my phone note 2/25/2020 for details.  He has no medical conditions which preclude diving, and his form was signed to that effect.    Past history of atrial fibrillation status post ablation:  He has been asymptomatic for over 1 year.  Cardiology is recommended he continue sotalol 40 mg twice daily.  His sotalol was refilled today.  Cardiology recommends no anticoagulation at this point due to his [id number] of 1 (hypertension).  Continue to monitor his pulse with the [employer] phone radha, and seek immediate evaluation for any arrhythmia  If he has any episode of palpitations or arrhythmia  greater than 24 hours, he is to resume Xarelto and call cardiology    Daljit Ryan MD

## 2020-04-27 DIAGNOSIS — K21.9 GASTROESOPHAGEAL REFLUX DISEASE WITHOUT ESOPHAGITIS: ICD-10-CM

## 2020-04-27 NOTE — PROGRESS NOTES
Patient called for refill:  Requested refill for omeprazole.    Rx refill sent to pharmacy per request.    F/U 1 month    BEATRIZ Ryan

## 2020-09-30 ENCOUNTER — TELEPHONE (OUTPATIENT)
Dept: FAMILY MEDICINE | Facility: CLINIC | Age: 60
End: 2020-09-30

## 2020-09-30 NOTE — TELEPHONE ENCOUNTER
I got the pt ED discharge paperwork, I called to check up o on the pt and help setup a ED follow up. The pt was at [facility] in [place] for a sore throat. I called the pt on 09/28/2020, 09/29/2020, and today. I left a vm for the pt to give me a call back. I have not heard from the pt or gotten a hold of him.

## 2020-10-05 NOTE — PROGRESS NOTES
Monitor summary: SR 72-91, MO 0.16, QRS 0.10, QT 0.38, with PVCs and PACs per strip from monitor room.      SUBJECTIVE:  This is a 57-year-old male who has had 4 days of cough, fatigue, runny stuffy nose and 2 days of red eyes.  About 4 days ago he started with a cough and 2 days ago felt very fatigued, feverish and chilled.  He has not measure his temperature.  He woke up Saturday morning with red eyes which were mattered shut.  He felt tired for much of the weekend and spent a lot of time sleeping.  He slept for about 12 hours, which is not typical.  He has a cough productive of white to yellow sputum.  He feels tired.  No specific muscle aches, but in general feels not well.  He has no energy.   REVIEW OF SYSTEMS:  Low appetite.  No nausea or vomiting, no stool changes and muscles feel tired but not specifically achy, no vision changes, no eye pain.   OBJECTIVE:   VITAL SIGNS:  As above.   GENERAL:  He is alert.  He relates his own history.   HEENT:  Head is normocephalic and his bilateral injection of the conjunctiva.  The lenses are clear.  Limited funduscopic exam is unremarkable.  Extraocular movements are intact.  Moist mucous membranes without tonsillar hypertrophy or exudate.  Tympanic membranes on the right he has an effusion with slight blunting of the light reflex.  On the left, the tympanic membrane is retracted.   CARDIOVASCULAR:  Regular rate and rhythm without murmur.   LUNGS:  He has fair air exchange.  The breath sounds on the right are slightly diminished compared to left.   ABDOMEN:  Soft and nontender.   SKIN:  The exposed skin is free of rash.   LABORATORY:  White blood cell count is slightly elevated at 13.5.   A chest x-ray is normal.   ASSESSMENT AND PLAN:   1.  Viral syndrome:  Differential diagnosis includes adenovirus, influenza, parainfluenza.  As he has had symptoms for greater than 48 hours and due to his risk status I did not recommend pursuing antiviral treatment.  Chest x-ray shows no sign of bacterial superinfection.  Recommend symptomatic cares with eustachian tube treatment with  pseudoephedrine for the next 3-5 days.   We discussed the risk of hypertension, avoiding use more than 5 days.  He may use Tylenol 650 mg up to 4 times a day or ibuprofen 600 mg up to 3 times a day and rest.  I anticipate symptoms to significantly improve over the course of 5 days and his cough to completely resolve within 3 weeks.  If he does not follow the expected course of recovery he should return to clinic.       2.  Conjunctivitis:  As he wears contacts, I recommend Polytrim drops over the next 7 days.

## 2020-10-16 DIAGNOSIS — L71.9 ROSACEA: ICD-10-CM

## 2020-10-16 RX ORDER — MINOCYCLINE HYDROCHLORIDE 50 MG/1
50 CAPSULE ORAL 2 TIMES DAILY
Qty: 60 CAPSULE | Refills: 1 | Status: SHIPPED | OUTPATIENT
Start: 2020-10-16 | End: 2021-05-03

## 2020-12-07 ENCOUNTER — TELEPHONE (OUTPATIENT)
Dept: FAMILY MEDICINE | Facility: CLINIC | Age: 60
End: 2020-12-07

## 2020-12-07 NOTE — TELEPHONE ENCOUNTER
I got the pt ED discharge paperwork, I called to check up on the pt and help setup a ED follow up. The pt was at the [facility] in [place] for COVID symptoms. The pt stated that he is doing fine, he is just waiting for his COVID testing results. Pt did not feel that he needs a follow up.

## 2021-02-25 DIAGNOSIS — I10 BENIGN ESSENTIAL HYPERTENSION: ICD-10-CM

## 2021-02-25 DIAGNOSIS — Z86.79 HISTORY OF ATRIAL FIBRILLATION: ICD-10-CM

## 2021-02-25 RX ORDER — LISINOPRIL 10 MG/1
10 TABLET ORAL DAILY
Qty: 90 TABLET | Refills: 3 | Status: SHIPPED | OUTPATIENT
Start: 2021-02-25 | End: 2021-12-09

## 2021-02-25 RX ORDER — SOTALOL HYDROCHLORIDE 80 MG/1
40 TABLET ORAL 2 TIMES DAILY
Qty: 90 TABLET | Refills: 3 | Status: SHIPPED | OUTPATIENT
Start: 2021-02-25 | End: 2021-12-09

## 2021-02-26 NOTE — TELEPHONE ENCOUNTER
Called, no answer, left voicemail to return call. Patient need to schedule follow up in march since it's been over a year.

## 2021-03-14 ENCOUNTER — TRANSFERRED RECORDS (OUTPATIENT)
Dept: HEALTH INFORMATION MANAGEMENT | Facility: CLINIC | Age: 61
End: 2021-03-14

## 2021-03-15 ENCOUNTER — TELEPHONE (OUTPATIENT)
Dept: FAMILY MEDICINE | Facility: CLINIC | Age: 61
End: 2021-03-15

## 2021-03-15 NOTE — TELEPHONE ENCOUNTER
I got the pt ED discharge paperwork, I called to check up on the pt and help setup a ED follow up. The pt was at the [facility] in [place] for a COVID testing.  Pt stated that he was traveling, and stopped to get a COVID testing.

## 2021-03-17 ENCOUNTER — IMMUNIZATION (OUTPATIENT)
Dept: NURSING | Facility: CLINIC | Age: 61
End: 2021-03-17
Payer: COMMERCIAL

## 2021-03-17 PROCEDURE — 91300 PR COVID VAC PFIZER DIL RECON 30 MCG/0.3 ML IM: CPT

## 2021-03-17 PROCEDURE — 0001A PR COVID VAC PFIZER DIL RECON 30 MCG/0.3 ML IM: CPT

## 2021-03-27 ENCOUNTER — HEALTH MAINTENANCE LETTER (OUTPATIENT)
Age: 61
End: 2021-03-27

## 2021-04-07 ENCOUNTER — IMMUNIZATION (OUTPATIENT)
Dept: NURSING | Facility: CLINIC | Age: 61
End: 2021-04-07
Attending: INTERNAL MEDICINE
Payer: COMMERCIAL

## 2021-04-07 PROCEDURE — 0002A PR COVID VAC PFIZER DIL RECON 30 MCG/0.3 ML IM: CPT

## 2021-04-07 PROCEDURE — 91300 PR COVID VAC PFIZER DIL RECON 30 MCG/0.3 ML IM: CPT

## 2021-05-03 ENCOUNTER — OFFICE VISIT (OUTPATIENT)
Dept: FAMILY MEDICINE | Facility: CLINIC | Age: 61
End: 2021-05-03
Payer: COMMERCIAL

## 2021-05-03 VITALS
RESPIRATION RATE: 16 BRPM | BODY MASS INDEX: 28.23 KG/M2 | HEIGHT: 75 IN | HEART RATE: 69 BPM | OXYGEN SATURATION: 100 % | DIASTOLIC BLOOD PRESSURE: 79 MMHG | TEMPERATURE: 97.9 F | WEIGHT: 227 LBS | SYSTOLIC BLOOD PRESSURE: 121 MMHG

## 2021-05-03 DIAGNOSIS — I10 BENIGN ESSENTIAL HYPERTENSION: Primary | ICD-10-CM

## 2021-05-03 DIAGNOSIS — L71.9 ROSACEA: ICD-10-CM

## 2021-05-03 DIAGNOSIS — Z13.220 SCREENING FOR LIPID DISORDERS: ICD-10-CM

## 2021-05-03 DIAGNOSIS — J30.2 SEASONAL ALLERGIC RHINITIS, UNSPECIFIED TRIGGER: ICD-10-CM

## 2021-05-03 LAB
BUN SERPL-MCNC: 12 MG/DL (ref 7–30)
CALCIUM SERPL-MCNC: 9.7 MG/DL (ref 8.5–10.4)
CHLORIDE SERPLBLD-SCNC: 99 MMOL/L (ref 94–109)
CHOLEST SERPL-MCNC: 221 MG/DL
CHOLEST/HDLC SERPL: 3.4 RATIO
CO2 SERPL-SCNC: 29 MMOL/L (ref 20–32)
CREAT SERPL-MCNC: 1 MG/DL (ref 0.8–1.5)
EGFR CALCULATED (NON BLACK REFERENCE): 81.3 ML/MIN
GLUCOSE SERPL-MCNC: 109 MG/DL (ref 60–109)
HDLC SERPL-MCNC: 66 MG/DL
LDLC SERPL CALC-MCNC: 126 MG/DL (ref 0–99)
POTASSIUM SERPL-SCNC: 4.5 MMOL/L (ref 3.4–5.3)
SODIUM SERPL-SCNC: 139 MMOL/L (ref 133–144)
TRIGL SERPL-MCNC: 147 MG/DL
VLDL-CHOLESTEROL: 29 MG/DL (ref 7–32)

## 2021-05-03 PROCEDURE — 80048 BASIC METABOLIC PNL TOTAL CA: CPT | Performed by: FAMILY MEDICINE

## 2021-05-03 PROCEDURE — 99214 OFFICE O/P EST MOD 30 MIN: CPT | Performed by: FAMILY MEDICINE

## 2021-05-03 PROCEDURE — 36415 COLL VENOUS BLD VENIPUNCTURE: CPT | Performed by: FAMILY MEDICINE

## 2021-05-03 PROCEDURE — 80061 LIPID PANEL: CPT | Performed by: FAMILY MEDICINE

## 2021-05-03 RX ORDER — OLOPATADINE HYDROCHLORIDE 1 MG/ML
1 SOLUTION/ DROPS OPHTHALMIC 2 TIMES DAILY
Qty: 5 ML | Refills: 3 | Status: SHIPPED | OUTPATIENT
Start: 2021-05-03 | End: 2023-10-20

## 2021-05-03 RX ORDER — MINOCYCLINE HYDROCHLORIDE 50 MG/1
50 CAPSULE ORAL 2 TIMES DAILY
Qty: 60 CAPSULE | Refills: 1 | Status: SHIPPED | OUTPATIENT
Start: 2021-05-03 | End: 2022-05-19

## 2021-05-03 ASSESSMENT — MIFFLIN-ST. JEOR: SCORE: 1933.42

## 2021-05-03 NOTE — PROGRESS NOTES
HPI:   Joe Copeland is a 60 year old  male who presents for:    Chief Complaint   Patient presents with     Recheck Medication     sotalol, lisinopril     Medication Reconciliation     complete     Hypertension: Continues on lisinopril 10 mg once daily without noted side effect.  He has at home blood pressure cuff, does not have readings with him today but home readings have been systolics in the 120s and even less in the past.  No chest pain episodes, dizziness, lightheadedness or orthostatic symptoms.  He remains very active was on a recent sailing trip, bikes often, exercises regularly.  Conscious of making healthy diet choices.    Seasonal allergy: Bothered recently with his seasonal allergies causing rhinitis, itchy red eyes.  He uses Zyrtec which helps with the symptoms.  Continues to have eye symptoms even with Zyrtec.  Interested in trying an eyedrop.    Rosacea: Long history of rosacea.  He has followed with dermatology in the past, and after trying various treatments he has found that intermittent use of minocycline and MetroCream is most effective for controlling his facial redness and outbreaks.  He typically uses minocycline 50 mg twice a day for about 7 days which clears up his skin along with MetroCream 0.75% twice daily during the period of time.  He is recently been bothered with increased redness and a couple bumps on his forehead.  He would like a refill on minocycline and MetroCream.  He has not had difficulty in the past with side effects of the medication.  We discussed the potential side effects particularly of minocycline, and sun sensitivity and at this point he would like to continue.  He does have a dermatologist that he saw recently for removal of benign skin lesion, but was not able to discuss rosacea at his most recent visit.    Past history of paroxysmal atrial fibrillation: He is status post ablation.  He has been asymptomatic now for over 2 years.  He continues on  sotalol 40 mg twice daily.  He follows up with cardiology annually.  He has not had palpitations, chest pain, dyspnea with exertion, or change in exercise tolerance.  He does have a phone radha that can monitor his pulse and detect atrial fibrillation.    Healthcare maintenance: Last lipid profile was in 2019 with an LDL of 136, HDL of 70, triglycerides 184.  He is open for repeat lipid profile today    Colon cancer screening: He underwent colonoscopy 3 weeks ago and had noncancerous polyps removed.  Repeat colonoscopy was recommended in 7 years.           PMHX:     Patient Active Problem List   Diagnosis     Paroxysmal atrial fibrillation (H)     Benign essential hypertension       Current Outpatient Medications   Medication Sig Dispense Refill     fluticasone (FLONASE) 50 MCG/ACT nasal spray Spray 1-2 sprays into both nostrils daily 11.1 mL 3     lisinopril (ZESTRIL) 10 MG tablet Take 1 tablet (10 mg) by mouth daily 90 tablet 3     metroNIDAZOLE (METROCREAM) 0.75 % external cream Apply topically 2 times daily 45 g 1     minocycline (MINOCIN) 50 MG capsule Take 1 capsule (50 mg) by mouth 2 times daily 60 capsule 1     olopatadine (PATANOL) 0.1 % ophthalmic solution Place 1 drop into both eyes 2 times daily 5 mL 3     sotalol (BETAPACE) 80 MG tablet Take 0.5 tablets (40 mg) by mouth 2 times daily 90 tablet 3     cetirizine (ZYRTEC) 10 MG tablet Take 1 tablet (10 mg) by mouth every evening 90 tablet 1       Social History     Tobacco Use     Smoking status: Never Smoker     Smokeless tobacco: Never Used   Substance Use Topics     Alcohol use: Yes     Comment: few drinks a day.     Drug use: No       Social History     Social History Narrative     Not on file       Allergies   Allergen Reactions     Seasonal Allergies        No results found for this or any previous visit (from the past 24 hour(s)).         Review of Systems:     General: No recent illness.  No fevers.  No significant weight change.  ENT: Itchy red  "eyes, clear rhinitis consistent with previous allergy symptoms  Cardiovascular: No palpitations, chest pain, dyspnea with exertion  Able to exercise vigorously without symptoms  Respiratory: No cough or dyspnea  GI: No stool changes           Physical Exam:     Vitals:    05/03/21 0829   BP: 121/79   BP Location: Right arm   Pulse: 69   Resp: 16   Temp: 97.9  F (36.6  C)   TempSrc: Oral   SpO2: 100%   Weight: 103 kg (227 lb)   Height: 1.91 m (6' 3.2\")     Body mass index is 28.22 kg/m .    General: Alert,  in no acute distress  HEENT: Head is free of trauma.   Sclerae non-icteric, mild injection. PERRL, Moist oral mucus membranes, no tonsilar hypertrophy or exudate. TM's white with normal bony and light landmarks.  Resp: Clear to auscultation bilaterally  CV: Regular rate and rhythm  Abd: Soft, non-tender.  Ext: Warm.  No edema  Skin: moderate erythema at forehead, nose and upper face. 3 erythematous comedones forehead.  Psych: Mood appropriate     Results for orders placed or performed in visit on 05/03/21   Basic Metabolic Panel ([facility]) - Results < 1 hr     Status: None   Result Value Ref Range    Glucose 109.0 60.0 - 109.0 mg/dL    Urea Nitrogen 12.0 7.0 - 30.0 mg/dL    Creatinine 1.0 0.8 - 1.5 mg/dL    Sodium 139.0 133.0 - 144.0 mmol/L    Potassium 4.5 3.4 - 5.3 mmol/L    Chloride 99.0 94.0 - 109.0 mmol/L    Carbon Dioxide 29.0 20.0 - 32.0 mmol/L    Calcium 9.7 8.5 - 10.4 mg/dL    eGFR Calculated (Non Black Reference) 81.3 >60.0 mL/min   Lipid Panel ([facility]) - Results < 1 hr     Status: Abnormal   Result Value Ref Range    Cholesterol 221.0 (H) <200.0 mg/dL    Triglycerides 147.0 <150.0 mg/dL    HDL Cholesterol 66.0 >40.0 mg/dL    VLDL-Cholesterol 29.0 7.0 - 32.0 mg/dL    LDL Cholesterol Direct 126.0 (H) 0.0 - 99.0 mg/dL    Cholesterol/HDL Ratio 3.4 <5.0 RATIO           Assessment and Plan   1. Rosacea  He has explored other nonantibiotic treatments in the past and not had good results.  He has tolerated " intermittent minocycline and metronidazole cream in the past with good results, and he would like to continue this plan.    He typically uses the minocycline and metronidazole for 7 to 14-day course, then discontinues after the skin has cleared.  60 doses will likely last him the full year.  Encouraged him to touch base with his dermatologist as far as alternative options at a future appointment.    Continue careful sun precautions, including sunscreen and wearing a large brimmed hat.    - metroNIDAZOLE (METROCREAM) 0.75 % external cream; Apply topically 2 times daily  Dispense: 45 g; Refill: 1  - minocycline (MINOCIN) 50 MG capsule; Take 1 capsule (50 mg) by mouth 2 times daily  Dispense: 60 capsule; Refill: 1    2. Benign essential hypertension    Well-controlled.  Goal blood pressure 140/90 or less.  Continue lisinopril 10 mg daily  Continue regular exercise, low-salt diet, alcohol moderation.    - Basic Metabolic Panel ([facility]) - Results < 1 hr  - Lipid Panel ([facility]) - Results < 1 hr      3. Seasonal allergic rhinitis, unspecified trigger    Continue Zyrtec 10 mg daily, and a trial of Patanol eyedrops.    - olopatadine (PATANOL) 0.1 % ophthalmic solution; Place 1 drop into both eyes 2 times daily  Dispense: 5 mL; Refill: 3    4. Screening for lipid disorders    Lipids were obtained today and reviewed.  His 10-year American Heart Association calculated risk is about 7.5%  Offered the option of a daily statin, but due to his current risk level we will continue with risk factor modification as outlined above.    - Lipid Panel ([facility]) - Results < 1 hr    Healthcare maintenance:  Colon cancer screening: Completed colonoscopy with [facility] in April 2021.  Underwent polypectomy of benign polyps, planned follow-up colonoscopy in 2028.  Continue routine skin screenings with his dermatologist  Lung cancer screening not indicated  Average risk for prostate cancer, due to unclear benefit of screening at this time  prostate cancer screening deferred.  Continue to discuss at annual visits.    Follow up: 1 year, earlier as needed  Options for treatment and follow-up care were reviewed with the patient and/or guardian. Joe Copeland and/or guardian engaged in the decision making process and verbalized understanding of the options discussed and agreed with the final plan.    Daljit Ryan MD  Faculty - [facility] [place] [facility]

## 2021-05-03 NOTE — PATIENT INSTRUCTIONS
Hypertension: Your blood pressure is well controlled.  Continue lisinopril daily  We checked your kidney function and electrolytes today, results will be sent when available    Cholesterol: Cholesterol test was obtained today.  Results will be sent when available.  As we discussed due to your age and hypertension a statin medication for risk reduction of cardiovascular disease is the current recommendation.  I support your choice to continue with daily exercise, healthy eating, and blood pressure control.    Rosacea: Minocycline and MetroCream prescriptions were sent to your pharmacy.  Be cautious with the sun as minocycline increases your chance for severe sunburn    History of atrial fibrillation: It sounds like you have not had any further episodes of atrial fibrillation.  Continue sotalol as prescribed by cardiology    Seasonal allergy: Try Patanol eyedrops to reduce your eye symptoms.  Continue with the daily Flonase and a nonsedating antihistamine such as either Claritin or Zyrtec    You are up-to-date on colonoscopy, you had a colonoscopy and benign polypectomy with [facility] about 3 weeks ago

## 2021-05-03 NOTE — RESULT ENCOUNTER NOTE
Your cholesterol is at goal level, continue to exercise regularly and make healthy diet choices.    Your kidney function and electrolytes are normal.    We will repeat your kidney function test in one year

## 2021-09-11 ENCOUNTER — HEALTH MAINTENANCE LETTER (OUTPATIENT)
Age: 61
End: 2021-09-11

## 2021-12-09 DIAGNOSIS — I10 BENIGN ESSENTIAL HYPERTENSION: ICD-10-CM

## 2021-12-09 DIAGNOSIS — Z86.79 HISTORY OF ATRIAL FIBRILLATION: ICD-10-CM

## 2021-12-09 RX ORDER — SOTALOL HYDROCHLORIDE 80 MG/1
40 TABLET ORAL 2 TIMES DAILY
Qty: 90 TABLET | Refills: 1 | Status: SHIPPED | OUTPATIENT
Start: 2021-12-09 | End: 2022-05-19

## 2021-12-09 RX ORDER — LISINOPRIL 10 MG/1
10 TABLET ORAL DAILY
Qty: 90 TABLET | Refills: 3 | Status: SHIPPED | OUTPATIENT
Start: 2021-12-09 | End: 2023-02-20

## 2021-12-09 NOTE — TELEPHONE ENCOUNTER
Message to physician:     Date of last visit: 5/3/2021    Date of next visit if scheduled:     Potassium   Date Value Ref Range Status   05/03/2021 4.5 3.4 - 5.3 mmol/L Final     Creatinine   Date Value Ref Range Status   05/03/2021 1.0 0.8 - 1.5 mg/dL Final       BP Readings from Last 3 Encounters:   05/03/21 121/79   03/02/20 123/84   01/06/20 125/85       Hemoglobin A1C   Date Value Ref Range Status   10/03/2019 5.0 4.1 - 5.7 % Final       Please complete refill and CLOSE ENCOUNTER.  Closing the encounter signifies the refill is complete.

## 2022-04-23 ENCOUNTER — HEALTH MAINTENANCE LETTER (OUTPATIENT)
Age: 62
End: 2022-04-23

## 2022-05-19 DIAGNOSIS — Z86.79 HISTORY OF ATRIAL FIBRILLATION: ICD-10-CM

## 2022-05-19 DIAGNOSIS — L71.9 ROSACEA: ICD-10-CM

## 2022-05-19 DIAGNOSIS — I10 BENIGN ESSENTIAL HYPERTENSION: ICD-10-CM

## 2022-05-20 RX ORDER — MINOCYCLINE HYDROCHLORIDE 50 MG/1
50 CAPSULE ORAL 2 TIMES DAILY
Qty: 60 CAPSULE | Refills: 2 | Status: SHIPPED | OUTPATIENT
Start: 2022-05-20 | End: 2024-02-28

## 2022-05-20 RX ORDER — SOTALOL HYDROCHLORIDE 80 MG/1
40 TABLET ORAL 2 TIMES DAILY
Qty: 90 TABLET | Refills: 2 | Status: SHIPPED | OUTPATIENT
Start: 2022-05-20 | End: 2023-02-20

## 2022-10-29 ENCOUNTER — HEALTH MAINTENANCE LETTER (OUTPATIENT)
Age: 62
End: 2022-10-29

## 2023-02-20 DIAGNOSIS — I10 BENIGN ESSENTIAL HYPERTENSION: ICD-10-CM

## 2023-02-20 DIAGNOSIS — Z86.79 HISTORY OF ATRIAL FIBRILLATION: ICD-10-CM

## 2023-02-20 RX ORDER — SOTALOL HYDROCHLORIDE 80 MG/1
40 TABLET ORAL 2 TIMES DAILY
Qty: 90 TABLET | Refills: 1 | Status: SHIPPED | OUTPATIENT
Start: 2023-02-20 | End: 2023-03-10

## 2023-02-20 RX ORDER — LISINOPRIL 10 MG/1
10 TABLET ORAL DAILY
Qty: 90 TABLET | Refills: 3 | Status: SHIPPED | OUTPATIENT
Start: 2023-02-20 | End: 2024-02-27

## 2023-02-23 ENCOUNTER — TELEPHONE (OUTPATIENT)
Dept: FAMILY MEDICINE | Facility: CLINIC | Age: 63
End: 2023-02-23
Payer: COMMERCIAL

## 2023-02-23 DIAGNOSIS — I48.0 PAROXYSMAL ATRIAL FIBRILLATION (H): Primary | ICD-10-CM

## 2023-02-23 NOTE — TELEPHONE ENCOUNTER
"Patient called reporting episodes of tachycardia/atrial fibrillation per his [employer] radha over the past couple weeks. Heart rate will jump from 65 to 120-130 while sitting at rest, fast heart rate lasts 20 minutes to an hour. Feels \"uncomfortable to have my heart racing\" during episodes but no pain or shortness of breath.  He self re-started xarelto which he had at home. Continues on sotalol 40mg twice daily.  Drinks a couple large cups of coffee per day.  Sleeping okay.  No other illness or symptoms.  Daughter Mildred is entering her second trimester of pregnancy (first grandchild) and wife is being evaluated for hip replacement. Trip to [place] for skiing coming up in 3 weeks and trip to [facility] in the spring.    Recommend a lab draw tomorrow for CMP, CBC, TSH, and magnesium.  Reduce caffeine to 1/2 cup of coffee with caffeine per day.  Maintain regular sleep schedule.  To ED if any chest pain, dyspnea, arm pain or new symptoms.  See me on Monday in clinic.    BEATRIZ Ryan MD  "

## 2023-02-24 ENCOUNTER — LAB (OUTPATIENT)
Dept: LAB | Facility: CLINIC | Age: 63
End: 2023-02-24
Payer: COMMERCIAL

## 2023-02-24 DIAGNOSIS — I48.0 PAROXYSMAL ATRIAL FIBRILLATION (H): ICD-10-CM

## 2023-02-24 LAB
ALBUMIN SERPL BCG-MCNC: 4 G/DL (ref 3.5–5.2)
ALP SERPL-CCNC: 85 U/L (ref 40–129)
ALT SERPL W P-5'-P-CCNC: 35 U/L (ref 10–50)
ANION GAP SERPL CALCULATED.3IONS-SCNC: 10 MMOL/L (ref 7–15)
AST SERPL W P-5'-P-CCNC: 37 U/L (ref 10–50)
BILIRUB SERPL-MCNC: 0.5 MG/DL
BUN SERPL-MCNC: 13.6 MG/DL (ref 8–23)
CALCIUM SERPL-MCNC: 8.8 MG/DL (ref 8.8–10.2)
CHLORIDE SERPL-SCNC: 105 MMOL/L (ref 98–107)
CREAT SERPL-MCNC: 1.01 MG/DL (ref 0.67–1.17)
DEPRECATED HCO3 PLAS-SCNC: 23 MMOL/L (ref 22–29)
ERYTHROCYTE [DISTWIDTH] IN BLOOD BY AUTOMATED COUNT: 13.6 % (ref 10–15)
GFR SERPL CREATININE-BSD FRML MDRD: 85 ML/MIN/1.73M2
GLUCOSE SERPL-MCNC: 94 MG/DL (ref 70–99)
HCT VFR BLD AUTO: 45 % (ref 40–53)
HGB BLD-MCNC: 14.7 G/DL (ref 13.3–17.7)
MAGNESIUM SERPL-MCNC: 2.2 MG/DL (ref 1.7–2.3)
MCH RBC QN AUTO: 30.1 PG (ref 26.5–33)
MCHC RBC AUTO-ENTMCNC: 32.7 G/DL (ref 31.5–36.5)
MCV RBC AUTO: 92 FL (ref 78–100)
PLATELET # BLD AUTO: 259 10E3/UL (ref 150–450)
POTASSIUM SERPL-SCNC: 4.9 MMOL/L (ref 3.4–5.3)
PROT SERPL-MCNC: 6.9 G/DL (ref 6.4–8.3)
RBC # BLD AUTO: 4.88 10E6/UL (ref 4.4–5.9)
SODIUM SERPL-SCNC: 138 MMOL/L (ref 136–145)
TSH SERPL DL<=0.005 MIU/L-ACNC: 1.34 UIU/ML (ref 0.3–4.2)
WBC # BLD AUTO: 7 10E3/UL (ref 4–11)

## 2023-02-24 PROCEDURE — 84443 ASSAY THYROID STIM HORMONE: CPT

## 2023-02-24 PROCEDURE — 83735 ASSAY OF MAGNESIUM: CPT

## 2023-02-24 PROCEDURE — 85027 COMPLETE CBC AUTOMATED: CPT

## 2023-02-24 PROCEDURE — 36415 COLL VENOUS BLD VENIPUNCTURE: CPT

## 2023-02-24 PROCEDURE — 80053 COMPREHEN METABOLIC PANEL: CPT

## 2023-02-25 NOTE — RESULT ENCOUNTER NOTE
"Your electrolytes, kidney function, magnesium and thyroid function are normal and are not the cause for your episodes of tachycardia.  The next step is to check with cardiology about an adjustment in your sotalol dose versus a new medication. Most of these medications require a current EKG and some monitoring (potentially via the \"[place]\" radha). I will reach out to the cardiology office on Monday to start the process. If it fits your schedule easily, it may be helpful to see me at [facility] on Monday 2/27 to get an EKG on file - call the clinic [phone] on Monday morning and we'll fit you in at a time that works for you.    "

## 2023-03-06 ENCOUNTER — OFFICE VISIT (OUTPATIENT)
Dept: FAMILY MEDICINE | Facility: CLINIC | Age: 63
End: 2023-03-06
Payer: COMMERCIAL

## 2023-03-06 VITALS
TEMPERATURE: 98.7 F | RESPIRATION RATE: 20 BRPM | HEART RATE: 92 BPM | WEIGHT: 230 LBS | HEIGHT: 75 IN | SYSTOLIC BLOOD PRESSURE: 134 MMHG | DIASTOLIC BLOOD PRESSURE: 80 MMHG | BODY MASS INDEX: 28.6 KG/M2 | OXYGEN SATURATION: 98 %

## 2023-03-06 DIAGNOSIS — L57.0 ACTINIC KERATOSIS: ICD-10-CM

## 2023-03-06 DIAGNOSIS — D48.5 NEOPLASM OF UNCERTAIN BEHAVIOR OF SKIN OF BACK: ICD-10-CM

## 2023-03-06 DIAGNOSIS — I48.0 PAROXYSMAL ATRIAL FIBRILLATION (H): Primary | ICD-10-CM

## 2023-03-06 PROCEDURE — 17000 DESTRUCT PREMALG LESION: CPT | Performed by: FAMILY MEDICINE

## 2023-03-06 PROCEDURE — 93000 ELECTROCARDIOGRAM COMPLETE: CPT | Mod: 59 | Performed by: FAMILY MEDICINE

## 2023-03-06 PROCEDURE — 17003 DESTRUCT PREMALG LES 2-14: CPT | Performed by: FAMILY MEDICINE

## 2023-03-06 PROCEDURE — 99214 OFFICE O/P EST MOD 30 MIN: CPT | Mod: 25 | Performed by: FAMILY MEDICINE

## 2023-03-06 NOTE — PATIENT INSTRUCTIONS
"For your atrial fibrillation/fast heart rate episodes:    Continue on your \"low dose\" of coffee each day    I have referred you to [facility] cardiology to discuss alternative medications to control your rate, and discuss options for re-trying an ablation procedure    Continue Xarelto and Sotalol for now    I will call if I get any advice from the cardiologist before you are able to see the cardiologist in their office      For your pre-cancerous skin lesions:  The lesion on your left ear (near the top, inside part of the ear) was treated with cryotherapy today   The larger lesion on your left shoulder was treated with cryotherapy today - this may require further cryotherapy and/or 5-florouracil treatment in the future  Return to have both of these spots checked in about 6 weeks, or have the dermatologist re-check them    For the red skin patch on your low back, make an appointment with your dermatologist to have this evaluated. I put in a referral, but you can schedule your own appointment wherever you choose  "

## 2023-03-08 RX ORDER — SOTALOL HYDROCHLORIDE 120 MG/1
60 TABLET ORAL 2 TIMES DAILY
Qty: 90 TABLET | Refills: 0 | Status: SHIPPED | OUTPATIENT
Start: 2023-03-08 | End: 2023-07-17

## 2023-03-08 NOTE — PROGRESS NOTES
"       HPI:   Joe Copeland is a 62 year old  male  who presents for:    Chief Complaint   Patient presents with     Follow up        Paroxysmal atrial fibrillation:   About 3 weeks ago started having episodes of racing heart, thought to be atrial fibrillation by the [employer] radha.  To briefly review, he had the first onset of atrial fibrillation back in the summer 2018.  He was evaluated at [place] cardiology and underwent an ablation with  at [facility] on 8/6/2018.  He underwent a \"typical flutter ablation\".  The evening of the procedure he went back into atrial fibrillation, so was discharged from the hospital on Xarelto for stroke prophylaxis and started on sotalol 120 mg twice daily.  He stayed in sinus rhythm with only very occasional episodes of atrial fibrillation, and his sotalol dose was titrated down over a number of months from 120 mg twice daily down to 40 mg twice daily (due to the side effect of loose stool, dose was titrated down to the lowest effective dose).  Over the past 3 years he has not had episodes of racing heart.  He stopped Xarelto back in 2019, at the time under the care of the [place] cardiology group.    About 3 weeks ago he started having episodes of racing heart, mainly in the evenings while resting in bed, reading.  He would feel his heart race, and the [employer] radha would measure his heart rate in the 120s to 130s, likely atrial fibrillation.  Episodes happened every day, and would last about 20 minutes.  He called me last week and reported these new symptoms, and was able to come to a lab only visit last week to have a comprehensive metabolic profile, magnesium, TSH, CBC drawn which returned normal.  Over the past few days the episodes are lasting closer to an hour.  He has not had a day without an episode in the last 2 weeks.  No chest pain, shortness of breath, dizziness, lightheadedness or orthostatic symptoms.  He has not had the onset of racing heart or " palpitations during exercise or exertion.  He can tolerate his usual physical activity.  He has gone cross-country skiing without symptoms.    He self re-started Xarelto about 2 weeks ago knowing that he was having episodes of atrial fibrillation per the [employer] radha.  He continues on sotalol 40 mg twice daily.    He has a trip planned to [place] with his wife in 2 weeks and would like better rhythm control before his trip.    Typically drinks about 3 cups of coffee per day, he reduced to 1 cup of coffee over the past few days.  No change in occasional alcohol use.  No other medication changes.    Skin lesion, left upper chest:  He has noticed a red flaky skin lesion at the left upper chest.  Been there for at least months.  Sometimes seems irritated.  Wonders if it is a irritated pimple or something different.    Skin lesion, midline low back:  His wife has noticed a dark red oval-shaped lesion at his midline low back over the past approximately 6 months.  Not sure what it is.  Does not bleed.  No pain or irritation.           PMHX:     Patient Active Problem List   Diagnosis     Paroxysmal atrial fibrillation (H)     Benign essential hypertension       Current Outpatient Medications   Medication Sig Dispense Refill     sotalol (BETAPACE) 120 MG tablet Take 0.5 tablets (60 mg) by mouth 2 times daily 90 tablet 0     cetirizine (ZYRTEC) 10 MG tablet Take 1 tablet (10 mg) by mouth every evening 90 tablet 1     fluticasone (FLONASE) 50 MCG/ACT nasal spray Spray 1-2 sprays into both nostrils daily 11.1 mL 3     lisinopril (ZESTRIL) 10 MG tablet Take 1 tablet (10 mg) by mouth daily 90 tablet 3     metroNIDAZOLE (METROCREAM) 0.75 % external cream Apply topically 2 times daily 45 g 1     minocycline (MINOCIN) 50 MG capsule Take 1 capsule (50 mg) by mouth 2 times daily 60 capsule 2     olopatadine (PATANOL) 0.1 % ophthalmic solution Place 1 drop into both eyes 2 times daily 5 mL 3     sotalol (BETAPACE) 80 MG tablet Take  "0.5 tablets (40 mg) by mouth 2 times daily 90 tablet 1       Social History     Tobacco Use     Smoking status: Never     Smokeless tobacco: Never   Substance Use Topics     Alcohol use: Yes     Comment: few drinks a day.     Drug use: No       Social History     Social History Narrative     Not on file       Allergies   Allergen Reactions     Seasonal Allergies        No results found for this or any previous visit (from the past 24 hour(s)).         Review of Systems:     General: No fevers chills or night sweats  No recent illness  ENT: No upper respiratory symptoms.  COVID screening questions are negative  Respiratory: No cough or shortness of breath  Cardiovascular: No chest pain.  Palpitations/racing heart as outlined in the HPI.  No new leg swelling  GI: Occasional loose stools, clearly had dose-dependent association with loose stools and sotalol 3 years ago  Skin: As outlined in HPI         Physical Exam:     Vitals:    03/06/23 1041   BP: 134/80   Pulse: 92   Resp: 20   Temp: 98.7  F (37.1  C)   TempSrc: Oral   SpO2: 98%   Weight: 104.3 kg (230 lb)   Height: 1.91 m (6' 3.2\")     Body mass index is 28.6 kg/m .    General: Alert, male in no acute distress  HEENT: Head is free of trauma.   Sclerae non-icteric. PERRL, Moist oral mucus membranes  Resp: Clear to auscultation bilaterally  CV: Regular rate and rhythm.  No murmur.  Ext: Warm.  No significant edema  Skin: Left upper pinna has a 2 mm flaky lesion on a erythematous base.  Overlying the left clavicle a 1.5 cm flaky skin lesion on erythematous base.  Midline low back approximately 2-1/2 cm oval lesion, deep red, blanchable.  Psych: Mood appropriate     Labs dated February 24, 2023 were reviewed today  Comprehensive metabolic profile is normal  TSH normal  Magnesium normal  CBC normal    12-lead EKG today:  Normal sinus rhythm  Small Q waves in 2, 3 and aVF, no acute ST or T wave changes  QTc is 420 ms      Assessment and Plan   1. Paroxysmal atrial " fibrillation (H)  His racing heart episodes are most likely paroxysmal atrial fibrillation  Continue on Xarelto for stroke prophylaxis, although his [id number] score is only 1 I think this is a reasonable approach and he has tolerated Xarelto well in the past, and he may wish to pursue ablation again in the future.  Continue sotalol 40 mg twice daily for now, but I will attempt to arrange a outpatient cardiology consultation to advise on improving his symptomatic rate control prior to his travel to [facility]  Continue to avoid caffeine, and alcohol.  Over the long-term he would like to explore other options such as repeating ablation, potentially other medications (flecanide) -and we will work to get him into the electrophysiologist as soon as possible.    Addendum:  On 3/8/2023 I was able to [facility] cardiology who reached out to the patient and schedule an appointment for Friday, 3/10/2023.  I reached the patient by phone at 1pm on 3/8/23 and had a 15-minute discussion on the diagnosis, treatment options, and follow-up plan.  As we are on a short timeline for improving his rate control prior to his trip, and his QTc is normal, we discussed the option of increasing his sotalol modestly to 60 mg twice daily, with a follow-up EKG at the cardiologist office this Friday to recheck his QTc and discuss any change in his symptoms, or any side effects.  We discussed other options including taking a wait and watch approach until he sees a cardiologist Friday, as well as the potential risks of sotalol including common side effects, and serious events such as life-threatening arrhythmia.  After discussing options and risks patient elected to try the dose increase of sotalol to 60 mg twice a day starting 3/8/2023 and follow-up as scheduled with Dr. Monge on Friday.      - EKG 12-lead complete w/read - Clinics  - Adult Cardiology Kianaal Arsalan Referral; Future  - sotalol (BETAPACE) 120 MG tablet; Take 0.5 tablets  (60 mg) by mouth 2 times daily  Dispense: 90 tablet; Refill: 0    2. Actinic keratosis  We discussed the precancerous nature of the lesion on his left ear and above his left clavicle, and treatment options including cryotherapy, 5-FU, or dermatology referral.  After discussion of risks and benefits elected cryotherapy with reevaluation in a couple of months and recheck by dermatology.    Procedure note  The lesion on the left ear and above the left clavicle were both treated with liquid nitrogen for 3 freeze thaw cycles without complication.    Reevaluate skin lesions as outlined in the patient instructions    - Adult Dermatology Referral; Future  - DESTRUCT PREMALIGNANT LESION, FIRST  - DESTRUCT PREMALIGNANT LESION, 2-14    3. 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  Recommend dermatology evaluation.  He has an established relationship with a dermatologist, and I put in a referral for him to follow-up with his dermatologist to have this evaluated.     Adult Dermatology Referral; Future      Follow up: With cardiology this Friday as scheduled  Options for treatment and follow-up care were reviewed with the patient and/or guardian. Joe Copeland and/or guardian engaged in the decision making process and verbalized understanding of the options discussed and agreed with the final plan.    Daljit Ryan MD  Faculty - [facility]

## 2023-03-10 ENCOUNTER — OFFICE VISIT (OUTPATIENT)
Dept: CARDIOLOGY | Facility: CLINIC | Age: 63
End: 2023-03-10
Attending: FAMILY MEDICINE
Payer: COMMERCIAL

## 2023-03-10 VITALS
DIASTOLIC BLOOD PRESSURE: 80 MMHG | HEART RATE: 69 BPM | OXYGEN SATURATION: 98 % | RESPIRATION RATE: 16 BRPM | WEIGHT: 231 LBS | SYSTOLIC BLOOD PRESSURE: 123 MMHG | BODY MASS INDEX: 28.72 KG/M2 | HEIGHT: 75 IN

## 2023-03-10 DIAGNOSIS — I48.3 TYPICAL ATRIAL FLUTTER (H): ICD-10-CM

## 2023-03-10 DIAGNOSIS — I48.0 PAROXYSMAL ATRIAL FIBRILLATION (H): Primary | ICD-10-CM

## 2023-03-10 PROBLEM — I48.92 ATRIAL FLUTTER (H): Status: ACTIVE | Noted: 2018-08-06

## 2023-03-10 PROCEDURE — 99205 OFFICE O/P NEW HI 60 MIN: CPT | Performed by: INTERNAL MEDICINE

## 2023-03-10 PROCEDURE — 93000 ELECTROCARDIOGRAM COMPLETE: CPT | Performed by: INTERNAL MEDICINE

## 2023-03-10 NOTE — LETTER
3/10/2023    Daljit Ryan MD  [address] [facility] [address]    RE: Joe Copeland       Dear Colleague,     I had the pleasure of seeing Joe Copeland in the [facility].    [facility]  Cardiac Electrophysiology     Consultation Note: Mj Monge MD    Primary Care: Daljit Ryan MD    Primary Cardiology: Daljit Ryan MD      Thank you, Daljit Chaves, for asking the [facility] Cardiac Arrhythmia care team to see Mr. Joe Copeland to evaluate paroxysmal atrial fibrillation.    Assessment/Recommendations   Assessment:      Paroxysmal atrial fibrillation: Patient diagnosed in 2018 following his flutter ablation.  He has been on chronic low-dose sotalol, but recently has had recurrent breakthrough atrial fibrillation despite medication compliance.  The patient recently had his sotalol dose increased to 60 mg twice daily with ECG today demonstrating sinus rhythm with rates in the low 60s and QT interval of 420 ms.  The underlying pathophysiology of his condition was discussed in detail.  The treatment options including ongoing medical therapy versus consideration of mapping and ablation of his arrhythmia were discussed in detail as well.  Risk, benefits, and expected outcomes were discussed.  Patient will consider the information provided further and render a decision when he returns from his vacation in [place].    Typical atrial flutter: Status post ablation 2018 with no documented recurrence of this atrial arrhythmia.    Primary hypertension: Chronic problem for the patient and his blood pressure today is at target on his current medical therapy.    Plan:    Continue current sotalol dose.    The patient will be contacted when he returns regarding whether or not he wishes to move forward with ablation of his arrhythmia.    If he does not wish to move forward, then I would propose that he undergo stress echo study to evaluate  him for ischemia.  This would permit us to initiate flecainide therapy if he continues to have breakthrough atrial fibrillation.    Follow-up in the arrhythmia clinic with the EP [facility] in 8 weeks if he decides not to move forward.                  History of Present Illness/Subjective    Mr. Joe Copeland is a 62 year old male with history of atrial arrhythmias dating back to 2017.  His presenting arrhythmia was apparently typical atrial flutter and he underwent right atrial cavotricuspid isthmus ablation by   Dr. Mullen in 2018.  The patient developed recurrent symptoms of palpitations later in 2018 and ultimately was diagnosed with atrial fibrillation.  The patient has been managed with sotalol at a low dose for several years and he reports that over the past 3-4 weeks his arrhythmia has been breaking through with multiple episodes typically last between 1 and 2 hours.  The patient experiences palpitations occasionally some mild chest pressure and is left upper chest.  He reports no other change in his general health care status.  He does consume 4 drinks per day (unchanged) and notes no other new symptoms.    ECG:   Personally reviewed.  Twelve-lead EKG from today demonstrates sinus rhythm with rates in the low 60s.  The [place] interval, QRS duration and QT interval (430 ms) are all normal.    ECHO:   Dated: 8/7/2018  1. Normal left ventricular chamber size and systolic function. Calculated left ventricular   ejection fraction (modified Yarbrough technique)    is 63 %.    2. Normal left ventricular diastolic function.    3. Normal right ventricular size and systolic function.    4. No significant valvular heart disease.    5. There were no prior studies available for comparison.    Estimated EF: 60-65%       Other Studies:   Stress echo 8/31/2018  1. Normal stress echocardiogram without evidence of stress-induced ischemia.    2. No chest pain or EKG abnormalities with exercise.    3. Normal resting LV function  "(EF 55%) with normal expected increase with exercise (70%) and   normal expected decrease in LV cavity    size.  No inducible wall motion abnormalities.    4. Good exercise tolerance.       Time spent: 65 minutes spent on the date of the encounter doing chart review, history and exam, documentation and further activities as noted above.       Physical Examination Review of Systems   /80 (BP Location: Right arm, Patient Position: Sitting, Cuff Size: Adult Regular)   Pulse 69   Resp 16   Ht 1.905 m (6' 3\")   Wt 104.8 kg (231 lb)   SpO2 98%   BMI 28.87 kg/m      Wt Readings from Last 3 Encounters:   03/06/23 104.3 kg (230 lb)   05/03/21 103 kg (227 lb)   03/02/20 101.6 kg (224 lb)         General     Appearance:   The patient is alert oriented to person place and situation.    The patient is in no acute distress at the time of my evaluation.   HEENT:  Pupils are equal, round, and reactive to light.  Conjunctiva and sclera are clear.  ENT: Oral mucosa is moist and without redness. No evident nasal discharge.   Neck: Without palpable thyroid or appreciable lymph nodes.   Chest: Symmetric, without deformity.   Lungs:   Clear bilaterally with no rales, rhonchi, or wheezes.     Cardiovascular:   Rhythm is regular. S1 and S2 are normal. No significant murmur is present. JVP is normal. Lower extremities demonstrate no significant edema. Distal pulses are intact bilaterally.   Abdomen:  Soft, non-tender, and without hepatosplenomegaly. Bowel sounds present.   Extremities: Without deformity.   Skin: Skin is warm, dry, and otherwise intact.   Neurologic: Gait is normal.           A 12 point comprehensive review of systems was negative except as noted.      Medical History  Surgical History Family History Social History   Past Medical History:   Diagnosis Date     Hypertension      NO ACTIVE PROBLEMS     Past Surgical History:   Procedure Laterality Date     NO HISTORY OF SURGERY      Family History   Problem Relation " Age of Onset     Hypertension Mother      Other Cancer Mother 85        kidney     Other Cancer Father 56        leukemia     Diabetes No family hx of      Coronary Artery Disease No family hx of      Cerebrovascular Disease No family hx of      Breast Cancer No family hx of      Colon Cancer No family hx of      Prostate Cancer No family hx of      Asthma No family hx of     Social History     Socioeconomic History     Marital status:      Spouse name: Not on file     Number of children: Not on file     Years of education: Not on file     Highest education level: Not on file   Occupational History     Not on file   Tobacco Use     Smoking status: Never     Smokeless tobacco: Never   Substance and Sexual Activity     Alcohol use: Yes     Comment: few drinks a day.     Drug use: No     Sexual activity: Not on file   Other Topics Concern     Parent/sibling w/ CABG, MI or angioplasty before 65F 55M? Not Asked   Social History Narrative     Not on file     Social Determinants of Health     Financial Resource Strain: Not on file   Food Insecurity: Not on file   Transportation Needs: Not on file   Physical Activity: Not on file   Stress: Not on file   Social Connections: Not on file   Intimate Partner Violence: Not on file   Housing Stability: Not on file          Medications  Allergies   Scheduled Meds:  Current Outpatient Medications   Medication Sig Dispense Refill     cetirizine (ZYRTEC) 10 MG tablet Take 1 tablet (10 mg) by mouth every evening 90 tablet 1     fluticasone (FLONASE) 50 MCG/ACT nasal spray Spray 1-2 sprays into both nostrils daily 11.1 mL 3     lisinopril (ZESTRIL) 10 MG tablet Take 1 tablet (10 mg) by mouth daily 90 tablet 3     metroNIDAZOLE (METROCREAM) 0.75 % external cream Apply topically 2 times daily 45 g 1     minocycline (MINOCIN) 50 MG capsule Take 1 capsule (50 mg) by mouth 2 times daily 60 capsule 2     olopatadine (PATANOL) 0.1 % ophthalmic solution Place 1 drop into both eyes 2  times daily 5 mL 3     sotalol (BETAPACE) 120 MG tablet Take 0.5 tablets (60 mg) by mouth 2 times daily 90 tablet 0     sotalol (BETAPACE) 80 MG tablet Take 0.5 tablets (40 mg) by mouth 2 times daily 90 tablet 1    Allergies   Allergen Reactions     Seasonal Allergies          Lab Results    Chemistry/lipid CBC Cardiac Enzymes/BNP/TSH/INR   Lab Results   Component Value Date    CHOL 221.0 (H) 05/03/2021    HDL 66.0 05/03/2021    TRIG 147.0 05/03/2021    BUN 13.6 02/24/2023     02/24/2023    CO2 23 02/24/2023    Lab Results   Component Value Date    WBC 7.0 02/24/2023    HGB 14.7 02/24/2023    HCT 45.0 02/24/2023    MCV 92 02/24/2023     02/24/2023    @RESUFAST(BMP,CBC,BNP,TSH,  INR)@      Mj Monge MD  Clinical Cardiac Electrophysiologist  [facility]      Thank you for allowing me to participate in the care of your patient.      Sincerely,     Mj Monge MD     [facility]  cc:   Daljit Ryan MD  [facility] [place] [address]

## 2023-03-10 NOTE — Clinical Note
Hi team, Please see my note from today. Patient is traveling until the end of the first week of April.  Can 1 of you please reach out to him when he returns to determine if he wishes to move forward with mapping/ablation of his atrial fibrillation. Mapping system: [email or web address] for double up. Please schedule patient for echo when he returns. As per my note if the patient declines moving ahead with ablation, then schedule him for a stress echo and follow-up as outlined. Thanks Mj

## 2023-03-10 NOTE — PROGRESS NOTES
[facility]  Cardiac Electrophysiology     Consultation Note: Mj Monge MD    Primary Care: Daljit Ryan MD    Primary Cardiology: Daljit Ryan MD      Thank you, Daljit Chaves, for asking the [facility] Cardiac Arrhythmia care team to see . Joe Copeland to evaluate paroxysmal atrial fibrillation.    Assessment/Recommendations   Assessment:      Paroxysmal atrial fibrillation: Patient diagnosed in 2018 following his flutter ablation.  He has been on chronic low-dose sotalol, but recently has had recurrent breakthrough atrial fibrillation despite medication compliance.  The patient recently had his sotalol dose increased to 60 mg twice daily with ECG today demonstrating sinus rhythm with rates in the low 60s and QT interval of 420 ms.  The underlying pathophysiology of his condition was discussed in detail.  The treatment options including ongoing medical therapy versus consideration of mapping and ablation of his arrhythmia were discussed in detail as well.  Risk, benefits, and expected outcomes were discussed.  Patient will consider the information provided further and render a decision when he returns from his vacation in [place].    Typical atrial flutter: Status post ablation 2018 with no documented recurrence of this atrial arrhythmia.    Primary hypertension: Chronic problem for the patient and his blood pressure today is at target on his current medical therapy.    Plan:    Continue current sotalol dose.    The patient will be contacted when he returns regarding whether or not he wishes to move forward with ablation of his arrhythmia.    If he does not wish to move forward, then I would propose that he undergo stress echo study to evaluate him for ischemia.  This would permit us to initiate flecainide therapy if he continues to have breakthrough atrial fibrillation.    Follow-up in the arrhythmia clinic with the EP [facility] in 8 weeks if he decides not to  move forward.                  History of Present Illness/Subjective    Mr. Joe Copeland is a 62 year old male with history of atrial arrhythmias dating back to 2017.  His presenting arrhythmia was apparently typical atrial flutter and he underwent right atrial cavotricuspid isthmus ablation by   Dr. Mullen in 2018.  The patient developed recurrent symptoms of palpitations later in 2018 and ultimately was diagnosed with atrial fibrillation.  The patient has been managed with sotalol at a low dose for several years and he reports that over the past 3-4 weeks his arrhythmia has been breaking through with multiple episodes typically last between 1 and 2 hours.  The patient experiences palpitations occasionally some mild chest pressure and is left upper chest.  He reports no other change in his general health care status.  He does consume 4 drinks per day (unchanged) and notes no other new symptoms.    ECG:   Personally reviewed.  Twelve-lead EKG from today demonstrates sinus rhythm with rates in the low 60s.  The [place] interval, QRS duration and QT interval (430 ms) are all normal.    ECHO:   Dated: 8/7/2018  1. Normal left ventricular chamber size and systolic function. Calculated left ventricular   ejection fraction (modified Yarbrough technique)    is 63 %.    2. Normal left ventricular diastolic function.    3. Normal right ventricular size and systolic function.    4. No significant valvular heart disease.    5. There were no prior studies available for comparison.    Estimated EF: 60-65%       Other Studies:   Stress echo 8/31/2018  1. Normal stress echocardiogram without evidence of stress-induced ischemia.    2. No chest pain or EKG abnormalities with exercise.    3. Normal resting LV function (EF 55%) with normal expected increase with exercise (70%) and   normal expected decrease in LV cavity    size.  No inducible wall motion abnormalities.    4. Good exercise tolerance.       Time spent: 65 minutes  "spent on the date of the encounter doing chart review, history and exam, documentation and further activities as noted above.       Physical Examination Review of Systems   /80 (BP Location: Right arm, Patient Position: Sitting, Cuff Size: Adult Regular)   Pulse 69   Resp 16   Ht 1.905 m (6' 3\")   Wt 104.8 kg (231 lb)   SpO2 98%   BMI 28.87 kg/m      Wt Readings from Last 3 Encounters:   03/06/23 104.3 kg (230 lb)   05/03/21 103 kg (227 lb)   03/02/20 101.6 kg (224 lb)         General     Appearance:   The patient is alert oriented to person place and situation.    The patient is in no acute distress at the time of my evaluation.   HEENT:  Pupils are equal, round, and reactive to light.  Conjunctiva and sclera are clear.  ENT: Oral mucosa is moist and without redness. No evident nasal discharge.   Neck: Without palpable thyroid or appreciable lymph nodes.   Chest: Symmetric, without deformity.   Lungs:   Clear bilaterally with no rales, rhonchi, or wheezes.     Cardiovascular:   Rhythm is regular. S1 and S2 are normal. No significant murmur is present. JVP is normal. Lower extremities demonstrate no significant edema. Distal pulses are intact bilaterally.   Abdomen:  Soft, non-tender, and without hepatosplenomegaly. Bowel sounds present.   Extremities: Without deformity.   Skin: Skin is warm, dry, and otherwise intact.   Neurologic: Gait is normal.           A 12 point comprehensive review of systems was negative except as noted.      Medical History  Surgical History Family History Social History   Past Medical History:   Diagnosis Date     Hypertension      NO ACTIVE PROBLEMS     Past Surgical History:   Procedure Laterality Date     NO HISTORY OF SURGERY      Family History   Problem Relation Age of Onset     Hypertension Mother      Other Cancer Mother 85        kidney     Other Cancer Father 56        leukemia     Diabetes No family hx of      Coronary Artery Disease No family hx of      " Cerebrovascular Disease No family hx of      Breast Cancer No family hx of      Colon Cancer No family hx of      Prostate Cancer No family hx of      Asthma No family hx of     Social History     Socioeconomic History     Marital status:      Spouse name: Not on file     Number of children: Not on file     Years of education: Not on file     Highest education level: Not on file   Occupational History     Not on file   Tobacco Use     Smoking status: Never     Smokeless tobacco: Never   Substance and Sexual Activity     Alcohol use: Yes     Comment: few drinks a day.     Drug use: No     Sexual activity: Not on file   Other Topics Concern     Parent/sibling w/ CABG, MI or angioplasty before 65F 55M? Not Asked   Social History Narrative     Not on file     Social Determinants of Health     Financial Resource Strain: Not on file   Food Insecurity: Not on file   Transportation Needs: Not on file   Physical Activity: Not on file   Stress: Not on file   Social Connections: Not on file   Intimate Partner Violence: Not on file   Housing Stability: Not on file          Medications  Allergies   Scheduled Meds:  Current Outpatient Medications   Medication Sig Dispense Refill     cetirizine (ZYRTEC) 10 MG tablet Take 1 tablet (10 mg) by mouth every evening 90 tablet 1     fluticasone (FLONASE) 50 MCG/ACT nasal spray Spray 1-2 sprays into both nostrils daily 11.1 mL 3     lisinopril (ZESTRIL) 10 MG tablet Take 1 tablet (10 mg) by mouth daily 90 tablet 3     metroNIDAZOLE (METROCREAM) 0.75 % external cream Apply topically 2 times daily 45 g 1     minocycline (MINOCIN) 50 MG capsule Take 1 capsule (50 mg) by mouth 2 times daily 60 capsule 2     olopatadine (PATANOL) 0.1 % ophthalmic solution Place 1 drop into both eyes 2 times daily 5 mL 3     sotalol (BETAPACE) 120 MG tablet Take 0.5 tablets (60 mg) by mouth 2 times daily 90 tablet 0     sotalol (BETAPACE) 80 MG tablet Take 0.5 tablets (40 mg) by mouth 2 times daily 90  tablet 1    Allergies   Allergen Reactions     Seasonal Allergies          Lab Results    Chemistry/lipid CBC Cardiac Enzymes/BNP/TSH/INR   Lab Results   Component Value Date    CHOL 221.0 (H) 05/03/2021    HDL 66.0 05/03/2021    TRIG 147.0 05/03/2021    BUN 13.6 02/24/2023     02/24/2023    CO2 23 02/24/2023    Lab Results   Component Value Date    WBC 7.0 02/24/2023    HGB 14.7 02/24/2023    HCT 45.0 02/24/2023    MCV 92 02/24/2023     02/24/2023    @RESUFAST(BMP,CBC,BNP,TSH,  INR)@      Mj Monge MD  Clinical Cardiac Electrophysiologist  [facility]

## 2023-03-13 ENCOUNTER — TELEPHONE (OUTPATIENT)
Dept: CARDIOLOGY | Facility: CLINIC | Age: 63
End: 2023-03-13
Payer: COMMERCIAL

## 2023-03-14 LAB
ATRIAL RATE - MUSE: 64 BPM
DIASTOLIC BLOOD PRESSURE - MUSE: NORMAL MMHG
INTERPRETATION ECG - MUSE: NORMAL
P AXIS - MUSE: 70 DEGREES
PR INTERVAL - MUSE: 196 MS
QRS DURATION - MUSE: 104 MS
QT - MUSE: 420 MS
QTC - MUSE: 433 MS
R AXIS - MUSE: 62 DEGREES
SYSTOLIC BLOOD PRESSURE - MUSE: NORMAL MMHG
T AXIS - MUSE: 26 DEGREES
VENTRICULAR RATE- MUSE: 64 BPM

## 2023-04-17 DIAGNOSIS — I48.0 PAROXYSMAL ATRIAL FIBRILLATION (H): Primary | ICD-10-CM

## 2023-04-17 NOTE — PROGRESS NOTES
Patient on Xarelto for CVA prophylaxis due to paroxysmal atrial fibrillation.  Needs refill, called with refill request - previously prescribed by his cardiologist.    Xarelto prescription sent to pharmacy. Patient notified.    BEATRIZ Ryan MD

## 2023-05-23 ENCOUNTER — OFFICE VISIT (OUTPATIENT)
Dept: CARDIOLOGY | Facility: CLINIC | Age: 63
End: 2023-05-23
Payer: COMMERCIAL

## 2023-05-23 VITALS
WEIGHT: 233 LBS | SYSTOLIC BLOOD PRESSURE: 122 MMHG | HEART RATE: 62 BPM | RESPIRATION RATE: 16 BRPM | DIASTOLIC BLOOD PRESSURE: 82 MMHG | HEIGHT: 75 IN | BODY MASS INDEX: 28.97 KG/M2

## 2023-05-23 DIAGNOSIS — I48.0 PAROXYSMAL ATRIAL FIBRILLATION (H): Primary | ICD-10-CM

## 2023-05-23 DIAGNOSIS — I48.3 TYPICAL ATRIAL FLUTTER (H): ICD-10-CM

## 2023-05-23 PROCEDURE — 99215 OFFICE O/P EST HI 40 MIN: CPT | Performed by: INTERNAL MEDICINE

## 2023-05-23 RX ORDER — LORATADINE 10 MG/1
10 TABLET ORAL PRN
COMMUNITY
End: 2024-08-26

## 2023-05-23 NOTE — PATIENT INSTRUCTIONS
[facility]  Cardiac Electrophysiology  [address]  [place] [address]   Office: [phone]  Fax: [phone]       Thank you for seeing us in clinic today - it is a pleasure to be a part of your care team.  Below is a summary of our plan from today's visit.       You have paroxysmal atrial fibrillation, presently being managed with sotalol and rivaroxaban.  We reviewed physiology and management options including antiarrhythmic drug therapy, catheter ablation and lifestyle modification.  We will plan for the following:  - I will ask our office to call you to review scheduling for atrial fibrillation ablation  - continue sotalol 60mg twice daily for now  - continue rivaroxaban 20mg daily for now - given your low estimated risk of stroke, it would be reasonable to stop rivaroxaban and use as needed (eg 1 week of therapy for AF episodes 24hrs or longer)     Please do not hesitate to be in touch with our office at [phone] with any questions that may arise.       Thank you for trusting us with your care,    Paul Jenkins MD  Clinical Cardiac Electrophysiology  [facility]  [address]  [place] [address]   Office: [phone]  Fax: [phone]              ATRIAL FIBRILLATION: Patient Information    What is atrial fibrillation?  Atrial fibrillation (AF, A-fib) is a common heart rhythm problem (arrhythmia) occurring within the upper chambers of the heart (the atria).  In normal rhythm, the upper and lower chambers of the heart are electrically driven to contract in a coordinated sequence.  In atrial fibrillation, the atria lose their ability to contract because of rapid and chaotic electrical activity.  The lower chambers of the heart (the ventricles) continue to pump blood throughout the body, though with irregular and often faster rate due to the chaotic activity within the atria.        How do I know if I have atrial  fibrillation?   Some people may feel their heart beating faster, harder, or irregularly while in atrial fibrillation.  Others may be lightheaded, fatigued, feel weak or tired or become more short of breath especially with activities.  Some patients have no symptoms at all.  Atrial fibrillation may be found due to an irregular pulse or on an electrocardiogram (ECG). Atrial fibrillation can start and stop on its own, and episodes can last from seconds to several months.      How common is atrial fibrillation?   An estimated 3-6 million people in the [place] have atrial fibrillation.  Atrial fibrillation is a common heart rhythm problem for older persons, affecting as estimated 12-15% of people over the age of 65 years of age.    What causes atrial fibrillation?   Age is the most important risk factor for atrial fibrillation.  Atrial fibrillation is more common in people with other heart disease, high blood pressure, diabetes, obesity, sleep apnea and in people who regularly consume alcohol.  Surgery, lung disease, or thyroid problems can lead to atrial fibrillation.  Atrial fibrillation has multiple possible causes, and in most cases a single cause cannot be found.  Atrial fibrillation is a progressive condition, usually starting with at an early stage with short and infrequent episodes.  In later stages of disease, more frequent and longer lasting episodes of atrial fibrillation occur, ultimately culminating in episodes which do not spontaneously terminate.  Generally, more enlargement and scarring within the upper chambers of the heart is observed as atrial fibrillation progresses from early to late-stage disease.    How is atrial fibrillation diagnosed and evaluated?    Because of its start-stop nature, atrial fibrillation can be challenging to diagnose.  Atrial fibrillation is most commonly diagnosed via cardiac rhythm recordings - either an ECG or wearable cardiac rhythm monitor.  For patients with  pacemakers, defibrillators or implantable loop recorders, atrial fibrillation may be recorded via these devices.  Recently, commercially available devices (eg. Apple Watch, [employer] device, certain [employer] devices, others) can allow patients to take 30 second cardiac rhythm recordings which may document atrial fibrillation.  Once atrial fibrillation is diagnosed, additional tests include blood tests and an echocardiogram.  The echocardiogram uses ultrasound to look at your heart to assess your cardiac function and evaluate for other heart disease.  Additional evaluation may include CT or MRI studies.    Is atrial fibrillation dangerous?   Atrial fibrillation is not usually a life-threatening arrhythmia.  The most serious consequences of atrial fibrillation including stroke and worsening of overall cardiac function.  While in atrial fibrillation, the upper cardiac chambers do not contract normally, resulting in slower blood flow and increased risk of clot formation.  If this blood clot becomes detached from the heart a stroke can occur.  Unfortunately, stroke can be the first sign of atrial fibrillation for some people.  With a stroke, you may notice abnormal sensation, weakness on one side of the body or face, changes in your vision or speech.  If you have any of these signs, you should contact EMS and be evaluated in an emergency room as soon as possible.      How is atrial fibrillation treated?     Several treatment options exist for suppressing atrial fibrillation - however, it is not an easily curable arrhythmia.  The first goal in managing atrial fibrillation is to minimize stroke risk.  The second goal is to improve symptoms associated with atrial fibrillation.  Finally, in patients with reduced cardiac function, maintaining normal rhythm can help improve cardiac function.      Blood thinners are used to reduce the risk of stroke in patients with high estimated stroke risk related to atrial fibrillation.  For  patients at higher risk of bleeding related to blood thinner use, implantable devices may be an option to reduce stroke risk without the need for long term blood thinner use.      Atrial fibrillation can be managed via two strategies: rate control and rhythm control.  In a rate control strategy, continued atrial fibrillation is accepted and medications (eg. beta-blockers or calcium channel blockers) are used to control the lower chamber rate.  In a rhythm control strategy, anti-arrhythmic medications or catheter ablation are used to maintain normal cardiac rhythm and slow disease progression by suppressing atrial fibrillation.  A procedure called a cardioversion, in which an electric shock is delivered through patches placed on the chest wall while under deep sedation, can be performed to temporarily restore normal cardiac rhythm, though does not address the chance of atrial fibrillation recurrence.  Treatments are more effective for earlier rather than later stage atrial fibrillation.  Lifestyle modifications (maintaining a healthy weight, aerobic exercise, diagnosing and treating sleep apnea, and minimizing alcohol intake) are important elements of atrial fibrillation rhythm control.     What is catheter ablation for atrial fibrillation?  Cardiac catheter ablation is a commonly performed, minimally invasive procedure performed by a cardiac electrophysiologist to treat many different cardiac rhythm abnormalities.  During catheter ablation, long, thin, flexible tubes are advanced into the heart via small sheaths inserted into the femoral veins and thermal energy (either heating or cooling) is applied within the heart to disrupt abnormal electrical activity.  Atrial fibrillation ablation is performed under general anesthesia, with procedures generally taking approximately 2-3 hours.  Patients are typically observed for 3-5 hours after the ablation, and in most cases can be discharged home the same day.  Atrial  fibrillation ablation is associated with better outcomes (mortality, cardiovascular hospitalizations, atrial arrhythmia recurrences) compared to antiarrhythmic drug therapy.  However, atrial fibrillation recurrences are not uncommon, and repeat catheter ablation may be offered.  Your electrophysiology team can review atrial fibrillation ablation, anticipated success rates, risks, and recovery expectations with you.    What are anti-arrhythmic medications?  Anti-arrhythmic medications are specialized drugs which alter cardiac electrical functioning to suppress arrhythmias.  There are several anti-arrhythmic medications available, each with its own success rate and side effects.  Some anti-arrhythmic medications are less effective though safer to use, others are more effective though have serious potential toxicities.  Atrial fibrillation recurrences are common and may require dose adjustment or change in antiarrhythmic therapy.  Your electrophysiology team will carefully consider which medication would be the best and safest for your particular case.      Can I live a normal life?    The goal of atrial fibrillation management is for patients to live normal lives without being limited by symptoms related to atrial fibrillation.    Are any additional educational resources available?  There are a number of excellent atrial fibrillation education resources available to you online.  A few options you may wish to review include:  hrsonline.org/guide-atrial-fibrillation  afibmatters.org  getsmartaboutafib.com  stopaf.com    What comes next?    Consider your management options and let us know how we can help in your decision process.  Please take medications as they have been prescribed.  You should also get any tests that may have been ordered for you.      When to Call Your Doctor or seek emergency care:  Call your doctor or seek emergency care if you have any significant changes with the  following:  Weakness  Dizziness  Fainting  Fatigue  Shortness of breath  Chest pain with increased activity  If you are concerned that your heart rate is too fast or too slow  Bleeding that does not stop in 10 minutes  Coughing or throwing up blood  Bloody diarrhea or bleeding hemorrhoids  Dark-colored urine or black stool  Allergic reactions:  Rash  Itching  Swelling  Trouble breathing or swallowing      Please call the [facility] at [phone] if you have concerns about your symptoms, your medicines, or your follow-up appointments.

## 2023-05-23 NOTE — PROGRESS NOTES
[facility]  Cardiac Electrophysiology  [address]  [place] [address]   Office: [phone]  Fax: [phone]     Cardiac Electrophysiology Consultation    Patient: Joe Copeland   : 1960     Referring Provider: Daljit Ryan MD  Primary Care Provider: Daljit Ryan MD    CHIEF COMPLAINT/REASON FOR CONSULTATION  Paroxysmal atrial fibrillation    Assessment/Recommendations   Joe Copeland is a 62 year old male with paroxysmal atrial fibrillation, typical atrial flutter with prior CTI ablation , HTN referred by Dr. Ryan for consultation regarding atrial fibrillation.    Paroxysmal atrial fibrillation - symptomatic with palpitations  [id number] 1  We reviewed atrial fibrillation physiology and management considerations including managing stroke risk, rate control, cardioversion, antiarrhythmic drug therapy, and catheter ablation.  We discussed atrial fibrillation ablation procedures, anticipated success rates, the potential need for re-do ablation vs addition of anti-arrhythmic drugs, procedural risks (including groin bleeding, tamponade, phrenic or esophageal injury, stroke, pulmonary vein stenosis) and recovery expectations.  He would prefer catheter ablation, though would prefer to do this around 2023  - PVI, assessment of prior CTI ablation, general anesthesia, continue rivaroxaban vs start 7 days prior, hold sotalol 3 days prior  - continue sotalol 60mg twice daily - QT/QTc 420/433ms by 3/10/2023 ECG  - given his [id number] 1, the long term risk-benefit balance of therapeutic anticoagulation is indeterminate, and decision regarding continuation vs cessation should take into account bleeding diatheses, stroke risk reduction and patient preference.  We discussed these elements today - he would prefer to stop rivaroxaban, and use as needed in case of longer AF episodes.  We will plan for the following: take 1 week of rivaroxaban 20mg daily in  "case of AF episode 24hrs or longer.  - we discussed the ongoing importance of lifestyle modification (maintaining a healthy weight, sleep apnea diagnosis and management, alcohol avoidance) as part of a long term strategy for atrial fibrillation management      Follow up: as above         History of Present Illness   Joe Copeland is a 62 year old male with paroxysmal atrial fibrillation, typical atrial flutter with prior CTI ablation 2018, HTN referred by Dr. Ryan for consultation regarding atrial fibrillation.    Mr. Copeland has a history of atrial flutter and underwent CTI ablation in 2018 by Dr. Mullen.  He was diagnosed with paroxysmal atrial fibrillation later in 2018 and was maintained on sotalol 40mg twice daily.  Due to progressive atrial fibrillation episodes, sotalol was increased to 60mg twice daily in early 2023 - he notes progressive breakthrough episodes (presently 2-3 episodes/week, 1-3hr episodes).  He was seen by Dr. Mj Monge 3/10/2023.  He has been using a Kardia.      He is very active and exercises routinely - he denies chest pain, unexpected dyspnea when not in AF.  He has an older brother with AF.  He is expecting his first grandchild in 8/2023.       Physical Examination  Review of Systems   VITALS: /82 (BP Location: Right arm, Patient Position: Sitting, Cuff Size: Adult Large)   Pulse 62   Resp 16   Ht 1.905 m (6' 3\")   Wt 105.7 kg (233 lb)   BMI 29.12 kg/m      Wt Readings from Last 3 Encounters:   03/10/23 104.8 kg (231 lb)   03/06/23 104.3 kg (230 lb)   05/03/21 103 kg (227 lb)     CONSTITUTIONAL: well nourished, comfortable, no distress  EYES:  Conjunctivae pink, sclerae clear.    E/N/T:  Oral mucosa pink  RESPIRATORY:  Respiratory effort is normal  CARDIOVASCULAR:  normal S1 and S2  GASTROINTESTINAL:  Abdomen without masses or tenderness  EXTREMITIES:  No clubbing or cyanosis.    MUSCULOSKELETAL:  Overall grossly normal muscle strength  SKIN:  Overall, skin " warm and dry, no lesions.  NEURO/PSYCH:  Oriented x 3 with normal affect.   Constitutional:  No weight loss or loss of appetite    Eyes:  No difficulty with vision, no double vision, no dry eyes  ENT:  No sore throat, difficulty swallowing; changes in hearing or tinnitus  Cardiovascular: As detailed above  Respiratory:  No cough  Musculoskeletal  No joint pain, muscle aches  Neurologic:  No syncope, lightheadedness, fainting spells   Hematologic: No easy bruising, excessive bleeding tendency   Gastrointestinal:  No jaundice, abdominal pain or abdominal bloating  Genitourinary: No changes in urinary habits, no trouble urinating    Psychiatric: No anxiety or depression      Medical History  Surgical History   Past Medical History:   Diagnosis Date     Hypertension      NO ACTIVE PROBLEMS     Past Surgical History:   Procedure Laterality Date     NO HISTORY OF SURGERY           Family History Social History   Family History   Problem Relation Age of Onset     Hypertension Mother      Pancreatic Cancer Mother      Thyroid Disease Mother      Leukemia Father      Leukemia Sister 2     Thyroid Disease Sister      Cerebrovascular Disease Brother 67     Skin Cancer Brother      Atrial fibrillation Brother      No Known Problems Brother      Diabetes No family hx of      Coronary Artery Disease No family hx of      Breast Cancer No family hx of      Colon Cancer No family hx of      Prostate Cancer No family hx of      Asthma No family hx of         Social History     Tobacco Use     Smoking status: Never     Smokeless tobacco: Never   Vaping Use     Vaping status: Never Used   Substance Use Topics     Alcohol use: Yes     Alcohol/week: 28.0 standard drinks of alcohol     Types: 28 Standard drinks or equivalent per week     Comment: 4 drinks a day.     Drug use: Never         Medications  Allergies     Current Outpatient Medications:      cetirizine (ZYRTEC) 10 MG tablet, Take 1 tablet (10 mg) by mouth every evening, Disp:  90 tablet, Rfl: 1     fluticasone (FLONASE) 50 MCG/ACT nasal spray, Spray 1-2 sprays into both nostrils daily (Patient not taking: Reported on 3/10/2023), Disp: 11.1 mL, Rfl: 3     lisinopril (ZESTRIL) 10 MG tablet, Take 1 tablet (10 mg) by mouth daily, Disp: 90 tablet, Rfl: 3     metroNIDAZOLE (METROCREAM) 0.75 % external cream, Apply topically 2 times daily (Patient not taking: Reported on 3/10/2023), Disp: 45 g, Rfl: 1     minocycline (MINOCIN) 50 MG capsule, Take 1 capsule (50 mg) by mouth 2 times daily, Disp: 60 capsule, Rfl: 2     olopatadine (PATANOL) 0.1 % ophthalmic solution, Place 1 drop into both eyes 2 times daily (Patient not taking: Reported on 3/10/2023), Disp: 5 mL, Rfl: 3     rivaroxaban ANTICOAGULANT (XARELTO) 20 MG TABS tablet, Take 1 tablet (20 mg) by mouth daily (with dinner), Disp: 90 tablet, Rfl: 0     sotalol (BETAPACE) 120 MG tablet, Take 0.5 tablets (60 mg) by mouth 2 times daily, Disp: 90 tablet, Rfl: 0     Allergies   Allergen Reactions     Seasonal Allergies           Lab Results    Chemistry CBC Cardiac Enzymes/BNP/TSH/INR   Recent Labs   Lab Test 02/24/23  1033      POTASSIUM 4.9   CHLORIDE 105   CO2 23   GLC 94   BUN 13.6   CR 1.01   GFRESTIMATED 85   TRAVIS 8.8     Recent Labs   Lab Test 02/24/23  1033 05/03/21  0923 01/06/20  0920   CR 1.01 1.0 0.8          Recent Labs   Lab Test 02/24/23  1033   WBC 7.0   HGB 14.7   HCT 45.0   MCV 92        Recent Labs   Lab Test 02/24/23  1033 10/16/17  1053   HGB 14.7 14.7    No results for input(s): TROPONINI in the last 16200 hours.  No results for input(s): BNP, NTBNPI, NTBNP in the last 30890 hours.  Recent Labs   Lab Test 02/24/23  1033   TSH 1.34     No results for input(s): INR in the last 43170 hours.      Data Review    ECGs (tracings independently reviewed)  3/10/2023 - SR 64bpm, QT/QTc 420/433ms    1/27/2016 exercise-TTE           Cc: Daljit Jenkins MD  5/23/2023  11:09 AM

## 2023-05-23 NOTE — LETTER
2023    Daljit Ryan MD  [address] [facility] [address]    RE: Joe Copeland       Dear Colleague,     I had the pleasure of seeing Joe Copeland in the [facility].     [facility]  Cardiac Electrophysiology  [address]  [place] [address]   Office: [phone]  Fax: [phone]     Cardiac Electrophysiology Consultation    Patient: Joe Copeland   : 1960     Referring Provider: Daljit Ryan MD  Primary Care Provider: Daljit Ryan MD    CHIEF COMPLAINT/REASON FOR CONSULTATION  Paroxysmal atrial fibrillation    Assessment/Recommendations   Joe Copeland is a 62 year old male with paroxysmal atrial fibrillation, typical atrial flutter with prior CTI ablation , HTN referred by Dr. Ryan for consultation regarding atrial fibrillation.    Paroxysmal atrial fibrillation - symptomatic with palpitations  [id number] 1  We reviewed atrial fibrillation physiology and management considerations including managing stroke risk, rate control, cardioversion, antiarrhythmic drug therapy, and catheter ablation.  We discussed atrial fibrillation ablation procedures, anticipated success rates, the potential need for re-do ablation vs addition of anti-arrhythmic drugs, procedural risks (including groin bleeding, tamponade, phrenic or esophageal injury, stroke, pulmonary vein stenosis) and recovery expectations.  He would prefer catheter ablation, though would prefer to do this around 2023  - PVI, assessment of prior CTI ablation, general anesthesia, continue rivaroxaban vs start 7 days prior, hold sotalol 3 days prior  - continue sotalol 60mg twice daily - QT/QTc 420/433ms by 3/10/2023 ECG  - given his [id number] 1, the long term risk-benefit balance of therapeutic anticoagulation is indeterminate, and decision regarding continuation vs cessation should take into account bleeding diatheses, stroke risk reduction and  "patient preference.  We discussed these elements today - he would prefer to stop rivaroxaban, and use as needed in case of longer AF episodes.  We will plan for the following: take 1 week of rivaroxaban 20mg daily in case of AF episode 24hrs or longer.  - we discussed the ongoing importance of lifestyle modification (maintaining a healthy weight, sleep apnea diagnosis and management, alcohol avoidance) as part of a long term strategy for atrial fibrillation management      Follow up: as above         History of Present Illness   Joe Copeland is a 62 year old male with paroxysmal atrial fibrillation, typical atrial flutter with prior CTI ablation 2018, HTN referred by Dr. Ryan for consultation regarding atrial fibrillation.    Mr. Copeland has a history of atrial flutter and underwent CTI ablation in 2018 by Dr. Mullen.  He was diagnosed with paroxysmal atrial fibrillation later in 2018 and was maintained on sotalol 40mg twice daily.  Due to progressive atrial fibrillation episodes, sotalol was increased to 60mg twice daily in early 2023 - he notes progressive breakthrough episodes (presently 2-3 episodes/week, 1-3hr episodes).  He was seen by Dr. Mj Monge 3/10/2023.  He has been using a Kardia.      He is very active and exercises routinely - he denies chest pain, unexpected dyspnea when not in AF.  He has an older brother with AF.  He is expecting his first grandchild in 8/2023.       Physical Examination  Review of Systems   VITALS: /82 (BP Location: Right arm, Patient Position: Sitting, Cuff Size: Adult Large)   Pulse 62   Resp 16   Ht 1.905 m (6' 3\")   Wt 105.7 kg (233 lb)   BMI 29.12 kg/m      Wt Readings from Last 3 Encounters:   03/10/23 104.8 kg (231 lb)   03/06/23 104.3 kg (230 lb)   05/03/21 103 kg (227 lb)     CONSTITUTIONAL: well nourished, comfortable, no distress  EYES:  Conjunctivae pink, sclerae clear.    E/N/T:  Oral mucosa pink  RESPIRATORY:  Respiratory effort is " normal  CARDIOVASCULAR:  normal S1 and S2  GASTROINTESTINAL:  Abdomen without masses or tenderness  EXTREMITIES:  No clubbing or cyanosis.    MUSCULOSKELETAL:  Overall grossly normal muscle strength  SKIN:  Overall, skin warm and dry, no lesions.  NEURO/PSYCH:  Oriented x 3 with normal affect.   Constitutional:  No weight loss or loss of appetite    Eyes:  No difficulty with vision, no double vision, no dry eyes  ENT:  No sore throat, difficulty swallowing; changes in hearing or tinnitus  Cardiovascular: As detailed above  Respiratory:  No cough  Musculoskeletal  No joint pain, muscle aches  Neurologic:  No syncope, lightheadedness, fainting spells   Hematologic: No easy bruising, excessive bleeding tendency   Gastrointestinal:  No jaundice, abdominal pain or abdominal bloating  Genitourinary: No changes in urinary habits, no trouble urinating    Psychiatric: No anxiety or depression      Medical History  Surgical History   Past Medical History:   Diagnosis Date    Hypertension     NO ACTIVE PROBLEMS     Past Surgical History:   Procedure Laterality Date    NO HISTORY OF SURGERY           Family History Social History   Family History   Problem Relation Age of Onset    Hypertension Mother     Pancreatic Cancer Mother     Thyroid Disease Mother     Leukemia Father     Leukemia Sister 2    Thyroid Disease Sister     Cerebrovascular Disease Brother 67    Skin Cancer Brother     Atrial fibrillation Brother     No Known Problems Brother     Diabetes No family hx of     Coronary Artery Disease No family hx of     Breast Cancer No family hx of     Colon Cancer No family hx of     Prostate Cancer No family hx of     Asthma No family hx of         Social History     Tobacco Use    Smoking status: Never    Smokeless tobacco: Never   Vaping Use    Vaping status: Never Used   Substance Use Topics    Alcohol use: Yes     Alcohol/week: 28.0 standard drinks of alcohol     Types: 28 Standard drinks or equivalent per week      Comment: 4 drinks a day.    Drug use: Never         Medications  Allergies     Current Outpatient Medications:     cetirizine (ZYRTEC) 10 MG tablet, Take 1 tablet (10 mg) by mouth every evening, Disp: 90 tablet, Rfl: 1    fluticasone (FLONASE) 50 MCG/ACT nasal spray, Spray 1-2 sprays into both nostrils daily (Patient not taking: Reported on 3/10/2023), Disp: 11.1 mL, Rfl: 3    lisinopril (ZESTRIL) 10 MG tablet, Take 1 tablet (10 mg) by mouth daily, Disp: 90 tablet, Rfl: 3    metroNIDAZOLE (METROCREAM) 0.75 % external cream, Apply topically 2 times daily (Patient not taking: Reported on 3/10/2023), Disp: 45 g, Rfl: 1    minocycline (MINOCIN) 50 MG capsule, Take 1 capsule (50 mg) by mouth 2 times daily, Disp: 60 capsule, Rfl: 2    olopatadine (PATANOL) 0.1 % ophthalmic solution, Place 1 drop into both eyes 2 times daily (Patient not taking: Reported on 3/10/2023), Disp: 5 mL, Rfl: 3    rivaroxaban ANTICOAGULANT (XARELTO) 20 MG TABS tablet, Take 1 tablet (20 mg) by mouth daily (with dinner), Disp: 90 tablet, Rfl: 0    sotalol (BETAPACE) 120 MG tablet, Take 0.5 tablets (60 mg) by mouth 2 times daily, Disp: 90 tablet, Rfl: 0     Allergies   Allergen Reactions    Seasonal Allergies           Lab Results    Chemistry CBC Cardiac Enzymes/BNP/TSH/INR   Recent Labs   Lab Test 02/24/23  1033      POTASSIUM 4.9   CHLORIDE 105   CO2 23   GLC 94   BUN 13.6   CR 1.01   GFRESTIMATED 85   TRAVIS 8.8     Recent Labs   Lab Test 02/24/23  1033 05/03/21  0923 01/06/20  0920   CR 1.01 1.0 0.8          Recent Labs   Lab Test 02/24/23  1033   WBC 7.0   HGB 14.7   HCT 45.0   MCV 92        Recent Labs   Lab Test 02/24/23  1033 10/16/17  1053   HGB 14.7 14.7    No results for input(s): TROPONINI in the last 08609 hours.  No results for input(s): BNP, NTBNPI, NTBNP in the last 99272 hours.  Recent Labs   Lab Test 02/24/23  1033   TSH 1.34     No results for input(s): INR in the last 62646 hours.      Data Review    ECGs (tracings  independently reviewed)  3/10/2023 - SR 64bpm, QT/QTc 420/433ms    1/27/2016 exercise-TTE           Cc: Daljit Jenkins MD  5/23/2023  11:09 AM          Thank you for allowing me to participate in the care of your patient.      Sincerely,     Paul Jenkins MD     [facility]  cc:   No referring provider defined for this encounter.

## 2023-06-01 ENCOUNTER — HEALTH MAINTENANCE LETTER (OUTPATIENT)
Age: 63
End: 2023-06-01

## 2023-06-07 ENCOUNTER — DOCUMENTATION ONLY (OUTPATIENT)
Dept: CARDIOLOGY | Facility: CLINIC | Age: 63
End: 2023-06-07
Payer: COMMERCIAL

## 2023-06-07 DIAGNOSIS — I48.0 PAROXYSMAL ATRIAL FIBRILLATION (H): Primary | ICD-10-CM

## 2023-06-07 NOTE — PROGRESS NOTES
H&P Date: Teach Date: RENATE No CT/  MRI No   PVI  Order Case Req Y  Order Set [] Lab/EKG  Orders [] Letter [] F/U RN Date:  NP follow-up Date:     AC Xarelto- START 7 days prior (continue rivaroxaban 20mg daily for now - given your low estimated risk of stroke, it would be reasonable to stop rivaroxaban and use as needed (eg 1 week of therapy for AF episodes 24hrs or longer)     AAD Sotalol-Hold 3 days prior   PPI Start Protonix 40mg Daily 3 days prior, 6wk post DM No     1960  Home:[phone] (home) Cell:[phone] (mobile)  Emergency Contact: ALICIA POZO [phone]  PCP: Daljit Ryan, [phone]    Important patient information for CSC/Cath Lab staff : None    [facility] EP Cath Lab Procedure Order   Ablation Type:  PVI- Atrial Fibrillation  Diagnosis:  AF  Ordering Provider: Dr Jenkins  Ordering Date: 6/7/2023    Scheduling Information:  Anticipated Case Duration:  Standard ( Case per day SA 2:1, DW 4:1, KA 3:1)   Scheduling Timeframe:  Next Available  Scheduling Restrictions: None  Scheduling Contact: Pt is aware of scheduling limitations at this time due to schedule, please call pt when schedule is available  EP RN Follow Up Apt: Schedule EP RN PC visit 3-4 days s/p PVI  MD Preference: Scheduling with ordering provider  Current Device/Device Co Needed for Procedure: None NoneNone  Pre-Procedural Testing needed: Echo  Mapping System Required:  Carto (Dr Jenkins and Dr Marley)  ICE Needed:  Yes  Anesthesia:General Anesthesia    [facility] EP Cath Lab Prep   H&P:  Schedule H&P with EP MINERVA, RN Teach, and Labs within 30 days of PVI  Pre-op Labs: CBC, BMP, Beta HcG if appropriate, and INR if on Warfarin will be ordered AM of procedure and Review of most recent labs, WEL for procedure  T&S Pre-Procedure Review: Does not need for PVI procedures  Medical Records Pertinent for Procedure:  Echo to be scheduled prior  Allergies: Reviewed allergies, no concerns regarding orders for procedure    Allergies   Allergen  Reactions     Wasp Venom Protein Other (See Comments)     Swelling and soreness.      Seasonal Allergies        Current Outpatient Medications:      cetirizine (ZYRTEC) 10 MG tablet, Take 1 tablet (10 mg) by mouth every evening (Patient not taking: Reported on 5/23/2023), Disp: 90 tablet, Rfl: 1     fluticasone (FLONASE) 50 MCG/ACT nasal spray, Spray 1-2 sprays into both nostrils daily, Disp: 11.1 mL, Rfl: 3     lisinopril (ZESTRIL) 10 MG tablet, Take 1 tablet (10 mg) by mouth daily, Disp: 90 tablet, Rfl: 3     loratadine (CLARITIN) 10 MG tablet, Take 10 mg by mouth daily, Disp: , Rfl:      metroNIDAZOLE (METROCREAM) 0.75 % external cream, Apply topically 2 times daily, Disp: 45 g, Rfl: 1     minocycline (MINOCIN) 50 MG capsule, Take 1 capsule (50 mg) by mouth 2 times daily, Disp: 60 capsule, Rfl: 2     olopatadine (PATANOL) 0.1 % ophthalmic solution, Place 1 drop into both eyes 2 times daily (Patient not taking: Reported on 3/10/2023), Disp: 5 mL, Rfl: 3     rivaroxaban ANTICOAGULANT (XARELTO) 20 MG TABS tablet, Take 1 tablet (20 mg) by mouth daily (with dinner), Disp: 90 tablet, Rfl: 0     sotalol (BETAPACE) 120 MG tablet, Take 0.5 tablets (60 mg) by mouth 2 times daily, Disp: 90 tablet, Rfl: 0    Documentation Date:6/7/2023 10:56 AM  Celia Mendiola RN

## 2023-07-17 DIAGNOSIS — I48.0 PAROXYSMAL ATRIAL FIBRILLATION (H): ICD-10-CM

## 2023-07-17 RX ORDER — SOTALOL HYDROCHLORIDE 120 MG/1
60 TABLET ORAL 2 TIMES DAILY
Qty: 90 TABLET | Refills: 0 | Status: SHIPPED | OUTPATIENT
Start: 2023-07-17 | End: 2023-10-10

## 2023-07-19 ENCOUNTER — HOSPITAL ENCOUNTER (OUTPATIENT)
Dept: CARDIOLOGY | Facility: HOSPITAL | Age: 63
Discharge: HOME | End: 2023-07-19
Attending: INTERNAL MEDICINE | Admitting: INTERNAL MEDICINE
Payer: COMMERCIAL

## 2023-07-19 DIAGNOSIS — I48.0 PAROXYSMAL ATRIAL FIBRILLATION (H): ICD-10-CM

## 2023-07-19 PROCEDURE — 93306 TTE W/DOPPLER COMPLETE: CPT

## 2023-07-19 PROCEDURE — 93306 TTE W/DOPPLER COMPLETE: CPT | Mod: 26 | Performed by: INTERNAL MEDICINE

## 2023-08-04 ENCOUNTER — TELEPHONE (OUTPATIENT)
Dept: CARDIOLOGY | Facility: CLINIC | Age: 63
End: 2023-08-04
Payer: COMMERCIAL

## 2023-08-04 NOTE — TELEPHONE ENCOUNTER
Orestes Paredes [facility] Ep Support Pool - [facility]  Caller: Unspecified (1 month ago)  Just wanted to FYI that I'd left multiple VM messages to get patient scheduled with no return call, I will be mailing out a letter.    -Jesse

## 2023-08-29 DIAGNOSIS — I48.0 PAROXYSMAL ATRIAL FIBRILLATION (H): Primary | ICD-10-CM

## 2023-08-29 NOTE — PROGRESS NOTES
Letter sent via [employer] and mail. ECG and lab orders for H&P placed. [employer] message was sent to ensure pt starts taking Xarelto regularly 7 days pre procedure.    Jelena Grant RN

## 2023-10-10 DIAGNOSIS — I48.0 PAROXYSMAL ATRIAL FIBRILLATION (H): ICD-10-CM

## 2023-10-10 RX ORDER — SOTALOL HYDROCHLORIDE 120 MG/1
60 TABLET ORAL 2 TIMES DAILY
Qty: 90 TABLET | Refills: 1 | Status: SHIPPED | OUTPATIENT
Start: 2023-10-10 | End: 2023-12-01

## 2023-10-19 ENCOUNTER — PREP FOR PROCEDURE (OUTPATIENT)
Dept: CARDIOLOGY | Facility: CLINIC | Age: 63
End: 2023-10-19
Payer: COMMERCIAL

## 2023-10-19 DIAGNOSIS — I48.0 PAROXYSMAL ATRIAL FIBRILLATION (H): Primary | ICD-10-CM

## 2023-10-19 RX ORDER — SODIUM CHLORIDE 9 MG/ML
100 INJECTION, SOLUTION INTRAVENOUS CONTINUOUS
Status: CANCELLED | OUTPATIENT
Start: 2023-10-19

## 2023-10-19 RX ORDER — FENTANYL CITRATE 50 UG/ML
25 INJECTION, SOLUTION INTRAMUSCULAR; INTRAVENOUS
Status: CANCELLED | OUTPATIENT
Start: 2023-10-19

## 2023-10-19 RX ORDER — LIDOCAINE 40 MG/G
CREAM TOPICAL
Status: CANCELLED | OUTPATIENT
Start: 2023-10-19

## 2023-10-20 ENCOUNTER — ALLIED HEALTH/NURSE VISIT (OUTPATIENT)
Dept: CARDIOLOGY | Facility: CLINIC | Age: 63
End: 2023-10-20
Payer: COMMERCIAL

## 2023-10-20 ENCOUNTER — LAB (OUTPATIENT)
Dept: CARDIOLOGY | Facility: CLINIC | Age: 63
End: 2023-10-20
Payer: COMMERCIAL

## 2023-10-20 ENCOUNTER — OFFICE VISIT (OUTPATIENT)
Dept: CARDIOLOGY | Facility: CLINIC | Age: 63
End: 2023-10-20
Payer: COMMERCIAL

## 2023-10-20 VITALS
OXYGEN SATURATION: 99 % | WEIGHT: 225 LBS | SYSTOLIC BLOOD PRESSURE: 123 MMHG | RESPIRATION RATE: 12 BRPM | HEART RATE: 70 BPM | BODY MASS INDEX: 28.12 KG/M2 | DIASTOLIC BLOOD PRESSURE: 73 MMHG

## 2023-10-20 DIAGNOSIS — I10 BENIGN ESSENTIAL HYPERTENSION: ICD-10-CM

## 2023-10-20 DIAGNOSIS — I48.0 PAROXYSMAL ATRIAL FIBRILLATION (H): Primary | ICD-10-CM

## 2023-10-20 DIAGNOSIS — I48.0 PAROXYSMAL ATRIAL FIBRILLATION (H): ICD-10-CM

## 2023-10-20 LAB
ANION GAP SERPL CALCULATED.3IONS-SCNC: 11 MMOL/L (ref 7–15)
ATRIAL RATE - MUSE: 74 BPM
BUN SERPL-MCNC: 16.5 MG/DL (ref 8–23)
CALCIUM SERPL-MCNC: 9.6 MG/DL (ref 8.8–10.2)
CHLORIDE SERPL-SCNC: 103 MMOL/L (ref 98–107)
CREAT SERPL-MCNC: 1.06 MG/DL (ref 0.67–1.17)
DEPRECATED HCO3 PLAS-SCNC: 22 MMOL/L (ref 22–29)
DIASTOLIC BLOOD PRESSURE - MUSE: NORMAL MMHG
EGFRCR SERPLBLD CKD-EPI 2021: 79 ML/MIN/1.73M2
ERYTHROCYTE [DISTWIDTH] IN BLOOD BY AUTOMATED COUNT: 12.4 % (ref 10–15)
GLUCOSE SERPL-MCNC: 86 MG/DL (ref 70–99)
HCT VFR BLD AUTO: 45.5 % (ref 40–53)
HGB BLD-MCNC: 15 G/DL (ref 13.3–17.7)
INTERPRETATION ECG - MUSE: NORMAL
MCH RBC QN AUTO: 31.4 PG (ref 26.5–33)
MCHC RBC AUTO-ENTMCNC: 33 G/DL (ref 31.5–36.5)
MCV RBC AUTO: 95 FL (ref 78–100)
P AXIS - MUSE: 72 DEGREES
PLATELET # BLD AUTO: 250 10E3/UL (ref 150–450)
POTASSIUM SERPL-SCNC: 5.1 MMOL/L (ref 3.4–5.3)
PR INTERVAL - MUSE: 186 MS
QRS DURATION - MUSE: 100 MS
QT - MUSE: 396 MS
QTC - MUSE: 439 MS
R AXIS - MUSE: 79 DEGREES
RBC # BLD AUTO: 4.78 10E6/UL (ref 4.4–5.9)
SODIUM SERPL-SCNC: 136 MMOL/L (ref 135–145)
SYSTOLIC BLOOD PRESSURE - MUSE: NORMAL MMHG
T AXIS - MUSE: 38 DEGREES
VENTRICULAR RATE- MUSE: 74 BPM
WBC # BLD AUTO: 8.7 10E3/UL (ref 4–11)

## 2023-10-20 PROCEDURE — 36415 COLL VENOUS BLD VENIPUNCTURE: CPT

## 2023-10-20 PROCEDURE — 85027 COMPLETE CBC AUTOMATED: CPT

## 2023-10-20 PROCEDURE — 99207 PR NO CHARGE NURSE ONLY: CPT

## 2023-10-20 PROCEDURE — 93000 ELECTROCARDIOGRAM COMPLETE: CPT | Performed by: INTERNAL MEDICINE

## 2023-10-20 PROCEDURE — 99214 OFFICE O/P EST MOD 30 MIN: CPT | Mod: 25 | Performed by: NURSE PRACTITIONER

## 2023-10-20 PROCEDURE — 80048 BASIC METABOLIC PNL TOTAL CA: CPT

## 2023-10-20 RX ORDER — PANTOPRAZOLE SODIUM 40 MG/1
40 TABLET, DELAYED RELEASE ORAL DAILY
Qty: 45 TABLET | Refills: 0 | Status: SHIPPED | OUTPATIENT
Start: 2023-10-20 | End: 2023-12-01

## 2023-10-20 NOTE — LETTER
10/20/2023    Daljit Ryan MD  [address] [address]    RE: Joe Copeland       Dear Colleague,     I had the pleasure of seeing Joe Copeland in the [facility].    HEART CARE ELECTROPHYSIOLOGY NOTE      [facility]  [phone]      Assessment/Recommendations   Assessment/Plan:  1. Paroxysmal Atrial Fibrillation:  Initially diagnosed 2018.  Symptoms consist of dyspnea.  Failed antiarrhythmic therapy with Sotalol.   He is scheduled for pulmonary vein isolation ablation with Dr Jenkins on 10/27/23.  We discussed ablation, <1% risk for complication, expected recovery, and follow-up.  he was instructed to discontinue Sotalol 3 days prior to ablation.  Start pantoprazole 40 mg daily 3 days prior to ablation, to continue for 6 weeks after ablation.  He states that his questions were answered to his satisfaction and he is ready to proceed.    He has a [id number]-[id number] score of 1 for HTN.  HAS-BLED score of 0.   Start Xarelto 7 days prior to ablation on 10/20/23 for stroke prophylaxis.  He reports no missed doses or bleeding issues.      2. Essential HTN: Blood pressure is at goal, 123/73.  Continue with current medication regimen which includes lisinopril 10 mg daily.         Telephone follow up with EP RN on 10/30/23  Follow-up with Modesta Meek CNP 6 weeks post PVI     History of Present Illness/Subjective    HPI: Joe Copeland is a 63 year old male who comes in for EP follow up of atrial fibrillation and history and physical prior to pulmonary vein isolation ablation.      Joe Copeland is a 62 year old male with paroxysmal atrial fibrillation, typical atrial flutter with prior CTI ablation 2018, HTN      Arrhythmia hx  Dx/date: diagnosed with paroxysmal atrial fibrillation later in 2018, He is very active and exercises routinely. He has an older brother with AF.    Sx: - he denies chest pain, unexpected dyspnea when not in  AF  Prior AAD, AV wilber blocking agents: maintained on sotalol 40mg twice daily after he was diagnosed. Due to progressive atrial fibrillation episodes, sotalol was increased to 60mg twice daily in early 2023 - he notes progressive breakthrough episodes (presently 2-3 episodes/week, 1-3hr episodes).  He was seen by Dr. Mj Monge 3/10/2023.  He has been using a Kardia.    Procedures  DCCV: no  Ablation:  atrial flutter and underwent CTI ablation in 2018 by Dr. Mullen.      Ventricular rates are well controlled in the 70s today on his current rhythm control strategy consisting of sotalol 60 mg twice daily, blood pressures normotensive 123/73.  He continues to monitor rhythm and rate using his Apple Watch.  Xarelto was initiated as of today.  Denies previous episodes of bleeding when he was on DOAC therapy back in 2018 when he underwent a flutter ablation by Dr. Mullen.  He denies chest discomfort, palpitations, abdominal fullness/bloating or peripheral edema, shortness of breath, paroxysmal nocturnal dyspnea, orthopnea, lightheadedness, dizziness, pre-syncope, or syncope.     Cardiographics (EKG personally reviewed):  ECGs (tracings independently reviewed)    10/20/23 normal sinus rhythm ventricular rate 74 bpm QRS duration 100 ms QT/QTc 396/439 ms      3/10/2023 - SR 64bpm, QT/QTc 420/433ms    8/6/2018 atrial flutter        7/19/23  ECHO  1. Normal left ventricular size and systolic performance with a visually  estimated ejection fraction of 55-60%.  2. No significant valvular heart disease is identified on this study.  3. Normal right ventricular size and systolic performance.          I have reviewed and updated the patient's Past Medical History, Social History, Family History and Medication List.  Outside records personally reviewed.     Physical Examination  Review of Systems   Vitals: There were no vitals taken for this visit.  BMI= There is no height or weight on file to calculate BMI.  Wt Readings  from Last 3 Encounters:   05/23/23 105.7 kg (233 lb)   03/10/23 104.8 kg (231 lb)   03/06/23 104.3 kg (230 lb)       General Appearance:   Alert, well-appearing and in no acute distress.   HEENT: Atraumatic, normocephalic.  No scleral icterus, normal conjunctivae, EOMs intact, PERRL.  Mucous membranes pink and moist.  No carotid bruit or obvious thyromegaly.   Chest/Lungs:   Chest symmetric, spine straight.  Respirations unlabored.  Lungs are clear to auscultation.   Cardiovascular:   Regular rate and rhythm.  Normal first and second heart sounds with no murmurs, rubs, or gallops; radial and posterior tibial pulses are intact, No JVD, no edema.   Abdomen:  Soft, nondistended, bowel sounds present.   Extremities: No cyanosis or clubbing.   Musculoskeletal: Moves all extremities.  Gait normal.   Skin: Warm, dry, intact.    Neurologic: Mood and affect are appropriate.  Alert and oriented to person, place, time, and situation.     ROS: 10 point ROS neg other than the symptoms noted above in the HPI.         Medical History  Surgical History Family History Social History   Past Medical History:   Diagnosis Date    Hypertension     NO ACTIVE PROBLEMS      Past Surgical History:   Procedure Laterality Date    NO HISTORY OF SURGERY       Family History   Problem Relation Age of Onset    Hypertension Mother     Pancreatic Cancer Mother     Thyroid Disease Mother     Leukemia Father     Leukemia Sister 2    Thyroid Disease Sister     Cerebrovascular Disease Brother 67    Skin Cancer Brother     Atrial fibrillation Brother     No Known Problems Brother     Diabetes No family hx of     Coronary Artery Disease No family hx of     Breast Cancer No family hx of     Colon Cancer No family hx of     Prostate Cancer No family hx of     Asthma No family hx of         Social History     Socioeconomic History    Marital status:      Spouse name: Not on file    Number of children: 2    Years of education: Not on file    Highest  education level: Not on file   Occupational History    Occupation:  [employer] for [employer]     Comment: 4.5 ys   Tobacco Use    Smoking status: Never    Smokeless tobacco: Never   Vaping Use    Vaping Use: Never used   Substance and Sexual Activity    Alcohol use: Yes     Alcohol/week: 28.0 standard drinks of alcohol     Types: 28 Standard drinks or equivalent per week     Comment: 4 drinks a day.    Drug use: Never    Sexual activity: Not Currently     Partners: Female   Other Topics Concern    Parent/sibling w/ CABG, MI or angioplasty before 65F 55M? Not Asked   Social History Narrative    Not on file     Social Determinants of Health     Financial Resource Strain: Not on file   Food Insecurity: Not on file   Transportation Needs: Not on file   Physical Activity: Not on file   Stress: Not on file   Social Connections: Not on file   Interpersonal Safety: Not on file   Housing Stability: Not on file           Medications  Allergies   Current Outpatient Medications   Medication Sig Dispense Refill    fluticasone (FLONASE) 50 MCG/ACT nasal spray Spray 1-2 sprays into both nostrils daily 11.1 mL 3    lisinopril (ZESTRIL) 10 MG tablet Take 1 tablet (10 mg) by mouth daily 90 tablet 3    loratadine (CLARITIN) 10 MG tablet Take 10 mg by mouth daily      metroNIDAZOLE (METROCREAM) 0.75 % external cream Apply topically 2 times daily 45 g 1    minocycline (MINOCIN) 50 MG capsule Take 1 capsule (50 mg) by mouth 2 times daily 60 capsule 2    rivaroxaban ANTICOAGULANT (XARELTO) 20 MG TABS tablet Take 1 tablet (20 mg) by mouth daily (with dinner) 90 tablet 3    sotalol (BETAPACE) 120 MG tablet Take 0.5 tablets (60 mg) by mouth 2 times daily 90 tablet 1       Allergies   Allergen Reactions    Wasp Venom Protein Other (See Comments)     Swelling and soreness.     Seasonal Allergies           Lab Results    Chemistry/lipid CBC Cardiac Enzymes/BNP/TSH/INR   Recent Labs   Lab Test 05/03/21  0923   CHOL 221.0*   HDL 66.0  "  .0*   TRIG 147.0   CHOLHDLRATIO 3.4     Recent Labs   Lab Test 05/03/21  0923 10/03/19  1356 08/21/17  0841   .0* 129.0* 136.0*     Recent Labs   Lab Test 02/24/23  1033      POTASSIUM 4.9   CHLORIDE 105   CO2 23   GLC 94   BUN 13.6   CR 1.01   GFRESTIMATED 85   TRAVIS 8.8     Recent Labs   Lab Test 02/24/23  1033 05/03/21  0923 01/06/20  0920   CR 1.01 1.0 0.8     Recent Labs   Lab Test 10/03/19  1356   A1C 5.0          Recent Labs   Lab Test 02/24/23  1033   WBC 7.0   HGB 14.7   HCT 45.0   MCV 92        Recent Labs   Lab Test 02/24/23  1033 10/16/17  1053   HGB 14.7 14.7    No results for input(s): \"TROPONINI\" in the last 12020 hours.  No results for input(s): \"BNP\", \"NTBNPI\", \"NTBNP\" in the last 97493 hours.  Recent Labs   Lab Test 02/24/23  1033   TSH 1.34     No results for input(s): \"INR\" in the last 69278 hours.   34 minutes were spent on the date of encounter performing chart review, history and exam, documentation, and further activities as noted above.      Modesta Meek NP  Clinical Cardiac Electrophysiology  [facility]  Office: [phone]  Fax: [phone]   Nurses: [phone]         Thank you for allowing me to participate in the care of your patient.      Sincerely,     Modesta Meek NP     [facility]  cc:   No referring provider defined for this encounter.      "

## 2023-10-20 NOTE — PROGRESS NOTES
Pre-Procedure Pulmonary Vein Ablation (AF) Education    Procedure: PVI with Dr Jenkins on 10/27 with arrival time 5:30am    COVID: Pt denies COVID like symptoms, and is aware if he/she develops COVID like symptoms they would need to complete an at home with a rapid antigen COVID test 1-2 days prior to your procedure date. If COVID + pt is aware the procedure will need to be rescheduled, and to contact CV scheduling as soon as possible    Type & Screen: Is not required for PVI Ablation    Pre-Op H&P: Completed today with EP MINERVA- See record in Epic    Education:   Reviewed with pt in Clinic today  Pre-Procedure Instruction: NPO after midnight pre procedure, Defined NPO, Remove all jewelry and leave all valuables at home, Shower prior to arrival, Anesthesia and intubation plan/orders, Intra-procedure PVI process, Post- PVI procedure expectations/recovery, Transportation requirements and arrangements post procedure, Post-procedure follow up process, Letter sent to pt via [employer] and mail with written instructions (Refer to letters tab), Lab results would be called to pt if abnormal  Risks:   Atrial Fibrillation Ablation/Left Atrial Ablation  Cardiac Ablation  <1% Hypotension, Hemorrhage, Thrombophlebitis, Systemic or pulmonic emboli, Cardiac perforation (tamponade), Infection, Pneumothorax, Arrhythmias, Proarrhythmic effects of drugs, Radiation exposure, Catheter entrapment  <1 % Vascular injury including perforation of vein, artery or heart  1-2% Tamponade and Aortic puncture with left sided transeptal approach  1% CVA   <1% MI  <0.1% death  If external defibrillation or CV is needed, 25% risk for superficial burn  Risks associated with general anesthesia will be addressed by the Anesthesiology Department  Radiofrequency Risks:  In addition to standard risks for Radiofrequency Ablation, there is:  <2% Significant pulmonary vein stenosis  <2% Embolic events  <1% Esophageal fistula  <1% Phrenic nerve paralysis    Cryoablation Risks:  In addition to standard risks for Cryoablation Ablation, there is:  <1% Phrenic nerve paralysis  <1% Pulmonary vein stenosis  <1% Esophageal fistula    Medication:   Instructions regarding anticoagulants: Start Xarelto 7 days (10/20) prior to procedure per EP procedural MD, take anticoagulation uninterrupted through procedure, do not miss any doses of AC, importance of taking AC for stroke prevention, taking AC as prescribed, to call prior to PVI if missed a dose of AC, and if upon arrival pt reports missing a dose of AC PVI will potentially be cnx/postponed    *Discussed with pt that Xarelto should be taken at the same time every day, with his biggest meal of the day.  Pt did take with food at breakfast, but starting tomorrow will switch to taking it with dinner which is his biggest meal of the day.     Instructions given to pt regarding antiarrhythmic medication: Sotalol- Hold 3 days prior to procedure last dose 10/23  Instructions given to pt regarding PPI medication: Start Protonix 40mg Daily 3 days prior, 6wk post Start 10/24  Instructions given to pt regarding diuretics medication: None  Instructions given to pt regarding DM/GLP-1 medication:   DM- None  GLP-1- None  Instructions for medication, other than anticoagulants and antiarrhythmics listed above, given to pt: hold all medication AM of procedure with small sips of water     Important patient information for staff: None    10/20/2023 1:33 PM  Opal Thomas RN

## 2023-10-20 NOTE — PATIENT INSTRUCTIONS
Joe Copeland,    It was a pleasure to see you today at the [facility].     My recommendations after this visit include:    You are scheduled for pulmonary vein isolation ablation with Dr Jenkins on 10/27/23     Take Xarelto every evening with dinner    On 10/24/23, start pantoprazole 40 mg daily, to continue for 6 weeks after ablation    Last dose of Sotalol on 10/23/23    Telephone follow up with EP RN on 10/30/23  Follow-up with Modesta Meek CNP 6 weeks post PVI    Modesta Meek CNP  [facility], Electrophysiology  [phone]  EP nurses [phone]

## 2023-10-20 NOTE — PROGRESS NOTES
HEART CARE ELECTROPHYSIOLOGY NOTE      [facility]  [phone]      Assessment/Recommendations   Assessment/Plan:  1. Paroxysmal Atrial Fibrillation:  Initially diagnosed 2018.  Symptoms consist of dyspnea.  Failed antiarrhythmic therapy with Sotalol.   He is scheduled for pulmonary vein isolation ablation with Dr Jenkins on 10/27/23.  We discussed ablation, <1% risk for complication, expected recovery, and follow-up.  he was instructed to discontinue Sotalol 3 days prior to ablation.  Start pantoprazole 40 mg daily 3 days prior to ablation, to continue for 6 weeks after ablation.  He states that his questions were answered to his satisfaction and he is ready to proceed.    He has a [id number]-APTc score of 1 for HTN.  HAS-BLED score of 0.   Start Xarelto 7 days prior to ablation on 10/20/23 for stroke prophylaxis.  He reports no missed doses or bleeding issues.      2. Essential HTN: Blood pressure is at goal, 123/73.  Continue with current medication regimen which includes lisinopril 10 mg daily.         Telephone follow up with EP RN on 10/30/23  Follow-up with Modesta Meek CNP 6 weeks post PVI     History of Present Illness/Subjective    HPI: Joe Copeland is a 63 year old male who comes in for EP follow up of atrial fibrillation and history and physical prior to pulmonary vein isolation ablation.      Joe Copeland is a 62 year old male with paroxysmal atrial fibrillation, typical atrial flutter with prior CTI ablation 2018, HTN      Arrhythmia hx  Dx/date: diagnosed with paroxysmal atrial fibrillation later in 2018, He is very active and exercises routinely. He has an older brother with AF.    Sx: - he denies chest pain, unexpected dyspnea when not in AF  Prior AAD, AV wilber blocking agents: maintained on sotalol 40mg twice daily after he was diagnosed. Due to progressive atrial fibrillation episodes, sotalol was increased to 60mg twice daily in early 2023 - he notes  progressive breakthrough episodes (presently 2-3 episodes/week, 1-3hr episodes).  He was seen by Dr. Mj Monge 3/10/2023.  He has been using a Kardia.    Procedures  DCCV: no  Ablation:  atrial flutter and underwent CTI ablation in 2018 by Dr. Mullen.      Ventricular rates are well controlled in the 70s today on his current rhythm control strategy consisting of sotalol 60 mg twice daily, blood pressures normotensive 123/73.  He continues to monitor rhythm and rate using his Apple Watch.  Xarelto was initiated as of today.  Denies previous episodes of bleeding when he was on DOAC therapy back in 2018 when he underwent a flutter ablation by Dr. Mullen.  He denies chest discomfort, palpitations, abdominal fullness/bloating or peripheral edema, shortness of breath, paroxysmal nocturnal dyspnea, orthopnea, lightheadedness, dizziness, pre-syncope, or syncope.     Cardiographics (EKG personally reviewed):  ECGs (tracings independently reviewed)    10/20/23 normal sinus rhythm ventricular rate 74 bpm QRS duration 100 ms QT/QTc 396/439 ms      3/10/2023 - SR 64bpm, QT/QTc 420/433ms    8/6/2018 atrial flutter        7/19/23  ECHO  1. Normal left ventricular size and systolic performance with a visually  estimated ejection fraction of 55-60%.  2. No significant valvular heart disease is identified on this study.  3. Normal right ventricular size and systolic performance.          I have reviewed and updated the patient's Past Medical History, Social History, Family History and Medication List.  Outside records personally reviewed.     Physical Examination  Review of Systems   Vitals: There were no vitals taken for this visit.  BMI= There is no height or weight on file to calculate BMI.  Wt Readings from Last 3 Encounters:   05/23/23 105.7 kg (233 lb)   03/10/23 104.8 kg (231 lb)   03/06/23 104.3 kg (230 lb)       General Appearance:   Alert, well-appearing and in no acute distress.   HEENT: Atraumatic, normocephalic.   No scleral icterus, normal conjunctivae, EOMs intact, PERRL.  Mucous membranes pink and moist.  No carotid bruit or obvious thyromegaly.   Chest/Lungs:   Chest symmetric, spine straight.  Respirations unlabored.  Lungs are clear to auscultation.   Cardiovascular:   Regular rate and rhythm.  Normal first and second heart sounds with no murmurs, rubs, or gallops; radial and posterior tibial pulses are intact, No JVD, no edema.   Abdomen:  Soft, nondistended, bowel sounds present.   Extremities: No cyanosis or clubbing.   Musculoskeletal: Moves all extremities.  Gait normal.   Skin: Warm, dry, intact.    Neurologic: Mood and affect are appropriate.  Alert and oriented to person, place, time, and situation.     ROS: 10 point ROS neg other than the symptoms noted above in the HPI.         Medical History  Surgical History Family History Social History   Past Medical History:   Diagnosis Date    Hypertension     NO ACTIVE PROBLEMS      Past Surgical History:   Procedure Laterality Date    NO HISTORY OF SURGERY       Family History   Problem Relation Age of Onset    Hypertension Mother     Pancreatic Cancer Mother     Thyroid Disease Mother     Leukemia Father     Leukemia Sister 2    Thyroid Disease Sister     Cerebrovascular Disease Brother 67    Skin Cancer Brother     Atrial fibrillation Brother     No Known Problems Brother     Diabetes No family hx of     Coronary Artery Disease No family hx of     Breast Cancer No family hx of     Colon Cancer No family hx of     Prostate Cancer No family hx of     Asthma No family hx of         Social History     Socioeconomic History    Marital status:      Spouse name: Not on file    Number of children: 2    Years of education: Not on file    Highest education level: Not on file   Occupational History    Occupation:  [employer]     Comment: 4.5 ys   Tobacco Use    Smoking status: Never    Smokeless tobacco: Never   Vaping Use    Vaping Use: Never  used   Substance and Sexual Activity    Alcohol use: Yes     Alcohol/week: 28.0 standard drinks of alcohol     Types: 28 Standard drinks or equivalent per week     Comment: 4 drinks a day.    Drug use: Never    Sexual activity: Not Currently     Partners: Female   Other Topics Concern    Parent/sibling w/ CABG, MI or angioplasty before 65F 55M? Not Asked   Social History Narrative    Not on file     Social Determinants of Health     Financial Resource Strain: Not on file   Food Insecurity: Not on file   Transportation Needs: Not on file   Physical Activity: Not on file   Stress: Not on file   Social Connections: Not on file   Interpersonal Safety: Not on file   Housing Stability: Not on file           Medications  Allergies   Current Outpatient Medications   Medication Sig Dispense Refill    fluticasone (FLONASE) 50 MCG/ACT nasal spray Spray 1-2 sprays into both nostrils daily 11.1 mL 3    lisinopril (ZESTRIL) 10 MG tablet Take 1 tablet (10 mg) by mouth daily 90 tablet 3    loratadine (CLARITIN) 10 MG tablet Take 10 mg by mouth daily      metroNIDAZOLE (METROCREAM) 0.75 % external cream Apply topically 2 times daily 45 g 1    minocycline (MINOCIN) 50 MG capsule Take 1 capsule (50 mg) by mouth 2 times daily 60 capsule 2    rivaroxaban ANTICOAGULANT (XARELTO) 20 MG TABS tablet Take 1 tablet (20 mg) by mouth daily (with dinner) 90 tablet 3    sotalol (BETAPACE) 120 MG tablet Take 0.5 tablets (60 mg) by mouth 2 times daily 90 tablet 1       Allergies   Allergen Reactions    Wasp Venom Protein Other (See Comments)     Swelling and soreness.     Seasonal Allergies           Lab Results    Chemistry/lipid CBC Cardiac Enzymes/BNP/TSH/INR   Recent Labs   Lab Test 05/03/21  0923   CHOL 221.0*   HDL 66.0   .0*   TRIG 147.0   CHOLHDLRATIO 3.4     Recent Labs   Lab Test 05/03/21  0923 10/03/19  1356 08/21/17  0841   .0* 129.0* 136.0*     Recent Labs   Lab Test 02/24/23  1033      POTASSIUM 4.9   CHLORIDE 105  "  CO2 23   GLC 94   BUN 13.6   CR 1.01   GFRESTIMATED 85   TRAVIS 8.8     Recent Labs   Lab Test 02/24/23  1033 05/03/21  0923 01/06/20  0920   CR 1.01 1.0 0.8     Recent Labs   Lab Test 10/03/19  1356   A1C 5.0          Recent Labs   Lab Test 02/24/23  1033   WBC 7.0   HGB 14.7   HCT 45.0   MCV 92        Recent Labs   Lab Test 02/24/23  1033 10/16/17  1053   HGB 14.7 14.7    No results for input(s): \"TROPONINI\" in the last 77559 hours.  No results for input(s): \"BNP\", \"NTBNPI\", \"NTBNP\" in the last 79136 hours.  Recent Labs   Lab Test 02/24/23  1033   TSH 1.34     No results for input(s): \"INR\" in the last 17136 hours.   34 minutes were spent on the date of encounter performing chart review, history and exam, documentation, and further activities as noted above.      Modesta Meek NP  Clinical Cardiac Electrophysiology  [facility] [place]  Office: [phone]  Fax: [phone]   Nurses: [phone]                                     "

## 2023-10-20 NOTE — H&P (VIEW-ONLY)
HEART CARE ELECTROPHYSIOLOGY NOTE      [facility]  [phone]      Assessment/Recommendations   Assessment/Plan:  1. Paroxysmal Atrial Fibrillation:  Initially diagnosed 2018.  Symptoms consist of dyspnea.  Failed antiarrhythmic therapy with Sotalol.   He is scheduled for pulmonary vein isolation ablation with Dr Jenkins on 10/27/23.  We discussed ablation, <1% risk for complication, expected recovery, and follow-up.  he was instructed to discontinue Sotalol 3 days prior to ablation.  Start pantoprazole 40 mg daily 3 days prior to ablation, to continue for 6 weeks after ablation.  He states that his questions were answered to his satisfaction and he is ready to proceed.    He has a [id number] score of 1 for HTN.  HAS-BLED score of 0.   Start Xarelto 7 days prior to ablation on 10/20/23 for stroke prophylaxis.  He reports no missed doses or bleeding issues.      2. Essential HTN: Blood pressure is at goal, 123/73.  Continue with current medication regimen which includes lisinopril 10 mg daily.         Telephone follow up with EP RN on 10/30/23  Follow-up with Modesta Meek CNP 6 weeks post PVI     History of Present Illness/Subjective    HPI: Joe Copeland is a 63 year old male who comes in for EP follow up of atrial fibrillation and history and physical prior to pulmonary vein isolation ablation.      Joe Copeland is a 62 year old male with paroxysmal atrial fibrillation, typical atrial flutter with prior CTI ablation 2018, HTN      Arrhythmia hx  Dx/date: diagnosed with paroxysmal atrial fibrillation later in 2018, He is very active and exercises routinely. He has an older brother with AF.    Sx: - he denies chest pain, unexpected dyspnea when not in AF  Prior AAD, AV wilber blocking agents: maintained on sotalol 40mg twice daily after he was diagnosed. Due to progressive atrial fibrillation episodes, sotalol was increased to 60mg twice daily in early 2023 - he notes  progressive breakthrough episodes (presently 2-3 episodes/week, 1-3hr episodes).  He was seen by Dr. Mj Monge 3/10/2023.  He has been using a Kardia.    Procedures  DCCV: no  Ablation:  atrial flutter and underwent CTI ablation in 2018 by Dr. Mullen.      Ventricular rates are well controlled in the 70s today on his current rhythm control strategy consisting of sotalol 60 mg twice daily, blood pressures normotensive 123/73.  He continues to monitor rhythm and rate using his Apple Watch.  Xarelto was initiated as of today.  Denies previous episodes of bleeding when he was on DOAC therapy back in 2018 when he underwent a flutter ablation by Dr. Mullen.  He denies chest discomfort, palpitations, abdominal fullness/bloating or peripheral edema, shortness of breath, paroxysmal nocturnal dyspnea, orthopnea, lightheadedness, dizziness, pre-syncope, or syncope.     Cardiographics (EKG personally reviewed):  ECGs (tracings independently reviewed)    10/20/23 normal sinus rhythm ventricular rate 74 bpm QRS duration 100 ms QT/QTc 396/439 ms      3/10/2023 - SR 64bpm, QT/QTc 420/433ms    8/6/2018 atrial flutter        7/19/23  ECHO  1. Normal left ventricular size and systolic performance with a visually  estimated ejection fraction of 55-60%.  2. No significant valvular heart disease is identified on this study.  3. Normal right ventricular size and systolic performance.          I have reviewed and updated the patient's Past Medical History, Social History, Family History and Medication List.  Outside records personally reviewed.     Physical Examination  Review of Systems   Vitals: There were no vitals taken for this visit.  BMI= There is no height or weight on file to calculate BMI.  Wt Readings from Last 3 Encounters:   05/23/23 105.7 kg (233 lb)   03/10/23 104.8 kg (231 lb)   03/06/23 104.3 kg (230 lb)       General Appearance:   Alert, well-appearing and in no acute distress.   HEENT: Atraumatic, normocephalic.   No scleral icterus, normal conjunctivae, EOMs intact, PERRL.  Mucous membranes pink and moist.  No carotid bruit or obvious thyromegaly.   Chest/Lungs:   Chest symmetric, spine straight.  Respirations unlabored.  Lungs are clear to auscultation.   Cardiovascular:   Regular rate and rhythm.  Normal first and second heart sounds with no murmurs, rubs, or gallops; radial and posterior tibial pulses are intact, No JVD, no edema.   Abdomen:  Soft, nondistended, bowel sounds present.   Extremities: No cyanosis or clubbing.   Musculoskeletal: Moves all extremities.  Gait normal.   Skin: Warm, dry, intact.    Neurologic: Mood and affect are appropriate.  Alert and oriented to person, place, time, and situation.     ROS: 10 point ROS neg other than the symptoms noted above in the HPI.         Medical History  Surgical History Family History Social History   Past Medical History:   Diagnosis Date    Hypertension     NO ACTIVE PROBLEMS      Past Surgical History:   Procedure Laterality Date    NO HISTORY OF SURGERY       Family History   Problem Relation Age of Onset    Hypertension Mother     Pancreatic Cancer Mother     Thyroid Disease Mother     Leukemia Father     Leukemia Sister 2    Thyroid Disease Sister     Cerebrovascular Disease Brother 67    Skin Cancer Brother     Atrial fibrillation Brother     No Known Problems Brother     Diabetes No family hx of     Coronary Artery Disease No family hx of     Breast Cancer No family hx of     Colon Cancer No family hx of     Prostate Cancer No family hx of     Asthma No family hx of         Social History     Socioeconomic History    Marital status:      Spouse name: Not on file    Number of children: 2    Years of education: Not on file    Highest education level: Not on file   Occupational History    Occupation:  [employer]     Comment: 4.5 ys   Tobacco Use    Smoking status: Never    Smokeless tobacco: Never   Vaping Use    Vaping Use: Never  used   Substance and Sexual Activity    Alcohol use: Yes     Alcohol/week: 28.0 standard drinks of alcohol     Types: 28 Standard drinks or equivalent per week     Comment: 4 drinks a day.    Drug use: Never    Sexual activity: Not Currently     Partners: Female   Other Topics Concern    Parent/sibling w/ CABG, MI or angioplasty before 65F 55M? Not Asked   Social History Narrative    Not on file     Social Determinants of Health     Financial Resource Strain: Not on file   Food Insecurity: Not on file   Transportation Needs: Not on file   Physical Activity: Not on file   Stress: Not on file   Social Connections: Not on file   Interpersonal Safety: Not on file   Housing Stability: Not on file           Medications  Allergies   Current Outpatient Medications   Medication Sig Dispense Refill    fluticasone (FLONASE) 50 MCG/ACT nasal spray Spray 1-2 sprays into both nostrils daily 11.1 mL 3    lisinopril (ZESTRIL) 10 MG tablet Take 1 tablet (10 mg) by mouth daily 90 tablet 3    loratadine (CLARITIN) 10 MG tablet Take 10 mg by mouth daily      metroNIDAZOLE (METROCREAM) 0.75 % external cream Apply topically 2 times daily 45 g 1    minocycline (MINOCIN) 50 MG capsule Take 1 capsule (50 mg) by mouth 2 times daily 60 capsule 2    rivaroxaban ANTICOAGULANT (XARELTO) 20 MG TABS tablet Take 1 tablet (20 mg) by mouth daily (with dinner) 90 tablet 3    sotalol (BETAPACE) 120 MG tablet Take 0.5 tablets (60 mg) by mouth 2 times daily 90 tablet 1       Allergies   Allergen Reactions    Wasp Venom Protein Other (See Comments)     Swelling and soreness.     Seasonal Allergies           Lab Results    Chemistry/lipid CBC Cardiac Enzymes/BNP/TSH/INR   Recent Labs   Lab Test 05/03/21  0923   CHOL 221.0*   HDL 66.0   .0*   TRIG 147.0   CHOLHDLRATIO 3.4     Recent Labs   Lab Test 05/03/21  0923 10/03/19  1356 08/21/17  0841   .0* 129.0* 136.0*     Recent Labs   Lab Test 02/24/23  1033      POTASSIUM 4.9   CHLORIDE 105  "  CO2 23   GLC 94   BUN 13.6   CR 1.01   GFRESTIMATED 85   TRAVIS 8.8     Recent Labs   Lab Test 02/24/23  1033 05/03/21  0923 01/06/20  0920   CR 1.01 1.0 0.8     Recent Labs   Lab Test 10/03/19  1356   A1C 5.0          Recent Labs   Lab Test 02/24/23  1033   WBC 7.0   HGB 14.7   HCT 45.0   MCV 92        Recent Labs   Lab Test 02/24/23  1033 10/16/17  1053   HGB 14.7 14.7    No results for input(s): \"TROPONINI\" in the last 63686 hours.  No results for input(s): \"BNP\", \"NTBNPI\", \"NTBNP\" in the last 66899 hours.  Recent Labs   Lab Test 02/24/23  1033   TSH 1.34     No results for input(s): \"INR\" in the last 92198 hours.   34 minutes were spent on the date of encounter performing chart review, history and exam, documentation, and further activities as noted above.      Modesta Meek NP  Clinical Cardiac Electrophysiology  [facility]  Office: [phone]  Fax: [phone]   Nurses: [phone]                                     "

## 2023-10-26 ENCOUNTER — ANESTHESIA EVENT (OUTPATIENT)
Dept: CARDIOLOGY | Facility: HOSPITAL | Age: 63
End: 2023-10-26
Payer: COMMERCIAL

## 2023-10-27 ENCOUNTER — ANESTHESIA (OUTPATIENT)
Dept: CARDIOLOGY | Facility: HOSPITAL | Age: 63
End: 2023-10-27
Payer: COMMERCIAL

## 2023-10-27 ENCOUNTER — HOSPITAL ENCOUNTER (OUTPATIENT)
Facility: HOSPITAL | Age: 63
Discharge: HOME | End: 2023-10-27
Attending: INTERNAL MEDICINE | Admitting: INTERNAL MEDICINE
Payer: COMMERCIAL

## 2023-10-27 VITALS
HEIGHT: 75 IN | TEMPERATURE: 97.9 F | WEIGHT: 225 LBS | OXYGEN SATURATION: 98 % | RESPIRATION RATE: 16 BRPM | BODY MASS INDEX: 27.98 KG/M2 | DIASTOLIC BLOOD PRESSURE: 75 MMHG | HEART RATE: 87 BPM | SYSTOLIC BLOOD PRESSURE: 125 MMHG

## 2023-10-27 DIAGNOSIS — I48.0 PAROXYSMAL ATRIAL FIBRILLATION (H): ICD-10-CM

## 2023-10-27 LAB
ACT BLD: 359 SECONDS (ref 74–150)
ACT BLD: 563 SECONDS (ref 74–150)
ATRIAL RATE - MUSE: 87 BPM
DIASTOLIC BLOOD PRESSURE - MUSE: NORMAL MMHG
INTERPRETATION ECG - MUSE: NORMAL
P AXIS - MUSE: 76 DEGREES
PR INTERVAL - MUSE: 184 MS
QRS DURATION - MUSE: 96 MS
QT - MUSE: 398 MS
QTC - MUSE: 478 MS
R AXIS - MUSE: 72 DEGREES
SYSTOLIC BLOOD PRESSURE - MUSE: NORMAL MMHG
T AXIS - MUSE: 40 DEGREES
VENTRICULAR RATE- MUSE: 87 BPM

## 2023-10-27 PROCEDURE — 250N000009 HC RX 250: Performed by: ANESTHESIOLOGY

## 2023-10-27 PROCEDURE — C1887 CATHETER, GUIDING: HCPCS | Performed by: INTERNAL MEDICINE

## 2023-10-27 PROCEDURE — 93656 COMPRE EP EVAL ABLTJ ATR FIB: CPT | Performed by: INTERNAL MEDICINE

## 2023-10-27 PROCEDURE — 93005 ELECTROCARDIOGRAM TRACING: CPT

## 2023-10-27 PROCEDURE — 710N000010 HC RECOVERY PHASE 1, LEVEL 2, PER MIN

## 2023-10-27 PROCEDURE — C1766 INTRO/SHEATH,STRBLE,NON-PEEL: HCPCS | Performed by: INTERNAL MEDICINE

## 2023-10-27 PROCEDURE — 999N000054 HC STATISTIC EKG NON-CHARGEABLE

## 2023-10-27 PROCEDURE — C1769 GUIDE WIRE: HCPCS | Performed by: INTERNAL MEDICINE

## 2023-10-27 PROCEDURE — 93623 PRGRMD STIMJ&PACG IV RX NFS: CPT | Performed by: INTERNAL MEDICINE

## 2023-10-27 PROCEDURE — 250N000011 HC RX IP 250 OP 636: Performed by: INTERNAL MEDICINE

## 2023-10-27 PROCEDURE — C1732 CATH, EP, DIAG/ABL, 3D/VECT: HCPCS | Performed by: INTERNAL MEDICINE

## 2023-10-27 PROCEDURE — 258N000003 HC RX IP 258 OP 636: Performed by: INTERNAL MEDICINE

## 2023-10-27 PROCEDURE — C1894 INTRO/SHEATH, NON-LASER: HCPCS | Performed by: INTERNAL MEDICINE

## 2023-10-27 PROCEDURE — C1733 CATH, EP, OTHR THAN COOL-TIP: HCPCS | Performed by: INTERNAL MEDICINE

## 2023-10-27 PROCEDURE — 250N000011 HC RX IP 250 OP 636: Mod: JZ

## 2023-10-27 PROCEDURE — 370N000017 HC ANESTHESIA TECHNICAL FEE, PER MIN: Performed by: INTERNAL MEDICINE

## 2023-10-27 PROCEDURE — 272N000001 HC OR GENERAL SUPPLY STERILE: Performed by: INTERNAL MEDICINE

## 2023-10-27 PROCEDURE — 93010 ELECTROCARDIOGRAM REPORT: CPT | Performed by: GENERAL ACUTE CARE HOSPITAL

## 2023-10-27 PROCEDURE — 250N000011 HC RX IP 250 OP 636: Performed by: ANESTHESIOLOGY

## 2023-10-27 PROCEDURE — 85347 COAGULATION TIME ACTIVATED: CPT

## 2023-10-27 PROCEDURE — C1730 CATH, EP, 19 OR FEW ELECT: HCPCS | Performed by: INTERNAL MEDICINE

## 2023-10-27 PROCEDURE — 258N000003 HC RX IP 258 OP 636: Performed by: ANESTHESIOLOGY

## 2023-10-27 PROCEDURE — C1759 CATH, INTRA ECHOCARDIOGRAPHY: HCPCS | Performed by: INTERNAL MEDICINE

## 2023-10-27 RX ORDER — ACETAMINOPHEN 325 MG/1
975 TABLET ORAL ONCE
Status: DISCONTINUED | OUTPATIENT
Start: 2023-10-27 | End: 2023-10-27 | Stop reason: HOSPADM

## 2023-10-27 RX ORDER — SODIUM CHLORIDE 9 MG/ML
100 INJECTION, SOLUTION INTRAVENOUS CONTINUOUS
Status: DISCONTINUED | OUTPATIENT
Start: 2023-10-27 | End: 2023-10-27 | Stop reason: HOSPADM

## 2023-10-27 RX ORDER — HEPARIN SODIUM 1000 [USP'U]/ML
INJECTION, SOLUTION INTRAVENOUS; SUBCUTANEOUS
Status: DISCONTINUED | OUTPATIENT
Start: 2023-10-27 | End: 2023-10-27 | Stop reason: HOSPADM

## 2023-10-27 RX ORDER — NALOXONE HYDROCHLORIDE 0.4 MG/ML
0.2 INJECTION, SOLUTION INTRAMUSCULAR; INTRAVENOUS; SUBCUTANEOUS
Status: DISCONTINUED | OUTPATIENT
Start: 2023-10-27 | End: 2023-10-27 | Stop reason: HOSPADM

## 2023-10-27 RX ORDER — HEPARIN SODIUM 10000 [USP'U]/100ML
INJECTION, SOLUTION INTRAVENOUS CONTINUOUS PRN
Status: DISCONTINUED | OUTPATIENT
Start: 2023-10-27 | End: 2023-10-27 | Stop reason: HOSPADM

## 2023-10-27 RX ORDER — FENTANYL CITRATE 50 UG/ML
INJECTION, SOLUTION INTRAMUSCULAR; INTRAVENOUS PRN
Status: DISCONTINUED | OUTPATIENT
Start: 2023-10-27 | End: 2023-10-27

## 2023-10-27 RX ORDER — ONDANSETRON 2 MG/ML
4 INJECTION INTRAMUSCULAR; INTRAVENOUS EVERY 30 MIN PRN
Status: DISCONTINUED | OUTPATIENT
Start: 2023-10-27 | End: 2023-10-27 | Stop reason: HOSPADM

## 2023-10-27 RX ORDER — OXYCODONE HYDROCHLORIDE 5 MG/1
10 TABLET ORAL
Status: DISCONTINUED | OUTPATIENT
Start: 2023-10-27 | End: 2023-10-27 | Stop reason: HOSPADM

## 2023-10-27 RX ORDER — ONDANSETRON 4 MG/1
4 TABLET, ORALLY DISINTEGRATING ORAL EVERY 6 HOURS PRN
Status: DISCONTINUED | OUTPATIENT
Start: 2023-10-27 | End: 2023-10-27 | Stop reason: HOSPADM

## 2023-10-27 RX ORDER — HYDROMORPHONE HYDROCHLORIDE 1 MG/ML
0.4 INJECTION, SOLUTION INTRAMUSCULAR; INTRAVENOUS; SUBCUTANEOUS EVERY 5 MIN PRN
Status: DISCONTINUED | OUTPATIENT
Start: 2023-10-27 | End: 2023-10-27 | Stop reason: HOSPADM

## 2023-10-27 RX ORDER — FENTANYL CITRATE 50 UG/ML
25 INJECTION, SOLUTION INTRAMUSCULAR; INTRAVENOUS EVERY 5 MIN PRN
Status: DISCONTINUED | OUTPATIENT
Start: 2023-10-27 | End: 2023-10-27 | Stop reason: HOSPADM

## 2023-10-27 RX ORDER — ONDANSETRON 2 MG/ML
4 INJECTION INTRAMUSCULAR; INTRAVENOUS EVERY 6 HOURS PRN
Status: DISCONTINUED | OUTPATIENT
Start: 2023-10-27 | End: 2023-10-27 | Stop reason: HOSPADM

## 2023-10-27 RX ORDER — NALOXONE HYDROCHLORIDE 0.4 MG/ML
0.4 INJECTION, SOLUTION INTRAMUSCULAR; INTRAVENOUS; SUBCUTANEOUS
Status: DISCONTINUED | OUTPATIENT
Start: 2023-10-27 | End: 2023-10-27 | Stop reason: HOSPADM

## 2023-10-27 RX ORDER — ONDANSETRON 4 MG/1
4 TABLET, ORALLY DISINTEGRATING ORAL EVERY 30 MIN PRN
Status: DISCONTINUED | OUTPATIENT
Start: 2023-10-27 | End: 2023-10-27 | Stop reason: HOSPADM

## 2023-10-27 RX ORDER — DEXAMETHASONE SODIUM PHOSPHATE 10 MG/ML
INJECTION, SOLUTION INTRAMUSCULAR; INTRAVENOUS PRN
Status: DISCONTINUED | OUTPATIENT
Start: 2023-10-27 | End: 2023-10-27

## 2023-10-27 RX ORDER — FENTANYL CITRATE 50 UG/ML
25 INJECTION, SOLUTION INTRAMUSCULAR; INTRAVENOUS
Status: DISCONTINUED | OUTPATIENT
Start: 2023-10-27 | End: 2023-10-27 | Stop reason: HOSPADM

## 2023-10-27 RX ORDER — LIDOCAINE 40 MG/G
CREAM TOPICAL
Status: DISCONTINUED | OUTPATIENT
Start: 2023-10-27 | End: 2023-10-27 | Stop reason: HOSPADM

## 2023-10-27 RX ORDER — SODIUM CHLORIDE, SODIUM LACTATE, POTASSIUM CHLORIDE, CALCIUM CHLORIDE 600; 310; 30; 20 MG/100ML; MG/100ML; MG/100ML; MG/100ML
INJECTION, SOLUTION INTRAVENOUS CONTINUOUS
Status: DISCONTINUED | OUTPATIENT
Start: 2023-10-27 | End: 2023-10-27 | Stop reason: HOSPADM

## 2023-10-27 RX ORDER — ACETAMINOPHEN 325 MG/1
650 TABLET ORAL EVERY 4 HOURS PRN
Status: DISCONTINUED | OUTPATIENT
Start: 2023-10-27 | End: 2023-10-27 | Stop reason: HOSPADM

## 2023-10-27 RX ORDER — PROPOFOL 10 MG/ML
INJECTION, EMULSION INTRAVENOUS PRN
Status: DISCONTINUED | OUTPATIENT
Start: 2023-10-27 | End: 2023-10-27

## 2023-10-27 RX ORDER — HYDROMORPHONE HYDROCHLORIDE 1 MG/ML
0.2 INJECTION, SOLUTION INTRAMUSCULAR; INTRAVENOUS; SUBCUTANEOUS EVERY 5 MIN PRN
Status: DISCONTINUED | OUTPATIENT
Start: 2023-10-27 | End: 2023-10-27 | Stop reason: HOSPADM

## 2023-10-27 RX ORDER — IBUPROFEN 600 MG/1
600 TABLET, FILM COATED ORAL EVERY 6 HOURS PRN
Status: DISCONTINUED | OUTPATIENT
Start: 2023-10-27 | End: 2023-10-27 | Stop reason: HOSPADM

## 2023-10-27 RX ORDER — ADENOSINE 3 MG/ML
INJECTION, SOLUTION INTRAVENOUS
Status: DISCONTINUED | OUTPATIENT
Start: 2023-10-27 | End: 2023-10-27 | Stop reason: HOSPADM

## 2023-10-27 RX ORDER — PROTAMINE SULFATE 10 MG/ML
INJECTION, SOLUTION INTRAVENOUS PRN
Status: DISCONTINUED | OUTPATIENT
Start: 2023-10-27 | End: 2023-10-27

## 2023-10-27 RX ORDER — SODIUM CHLORIDE 9 MG/ML
INJECTION, SOLUTION INTRAVENOUS CONTINUOUS PRN
Status: DISCONTINUED | OUTPATIENT
Start: 2023-10-27 | End: 2023-10-27

## 2023-10-27 RX ORDER — FENTANYL CITRATE 50 UG/ML
50 INJECTION, SOLUTION INTRAMUSCULAR; INTRAVENOUS EVERY 5 MIN PRN
Status: DISCONTINUED | OUTPATIENT
Start: 2023-10-27 | End: 2023-10-27 | Stop reason: HOSPADM

## 2023-10-27 RX ORDER — ONDANSETRON 2 MG/ML
INJECTION INTRAMUSCULAR; INTRAVENOUS PRN
Status: DISCONTINUED | OUTPATIENT
Start: 2023-10-27 | End: 2023-10-27

## 2023-10-27 RX ORDER — OXYCODONE HYDROCHLORIDE 5 MG/1
5 TABLET ORAL
Status: DISCONTINUED | OUTPATIENT
Start: 2023-10-27 | End: 2023-10-27 | Stop reason: HOSPADM

## 2023-10-27 RX ADMIN — ROCURONIUM BROMIDE 30 MG: 50 INJECTION, SOLUTION INTRAVENOUS at 08:26

## 2023-10-27 RX ADMIN — PROPOFOL 180 MG: 10 INJECTION, EMULSION INTRAVENOUS at 06:57

## 2023-10-27 RX ADMIN — ROCURONIUM BROMIDE 10 MG: 50 INJECTION, SOLUTION INTRAVENOUS at 06:57

## 2023-10-27 RX ADMIN — SODIUM CHLORIDE: 9 INJECTION, SOLUTION INTRAVENOUS at 08:06

## 2023-10-27 RX ADMIN — SODIUM CHLORIDE: 9 INJECTION, SOLUTION INTRAVENOUS at 06:53

## 2023-10-27 RX ADMIN — FENTANYL CITRATE 50 MCG: 50 INJECTION INTRAMUSCULAR; INTRAVENOUS at 07:07

## 2023-10-27 RX ADMIN — MIDAZOLAM 2 MG: 1 INJECTION INTRAMUSCULAR; INTRAVENOUS at 06:57

## 2023-10-27 RX ADMIN — PHENYLEPHRINE HYDROCHLORIDE 0.4 MCG/KG/MIN: 10 INJECTION INTRAVENOUS at 07:07

## 2023-10-27 RX ADMIN — ONDANSETRON 4 MG: 2 INJECTION INTRAMUSCULAR; INTRAVENOUS at 08:26

## 2023-10-27 RX ADMIN — SUGAMMADEX 200 MG: 100 INJECTION, SOLUTION INTRAVENOUS at 08:32

## 2023-10-27 RX ADMIN — DEXAMETHASONE SODIUM PHOSPHATE 6 MG: 10 INJECTION, SOLUTION INTRAMUSCULAR; INTRAVENOUS at 07:24

## 2023-10-27 RX ADMIN — PHENYLEPHRINE HYDROCHLORIDE 100 MCG: 10 INJECTION INTRAVENOUS at 06:58

## 2023-10-27 RX ADMIN — SODIUM CHLORIDE 100 ML/HR: 9 INJECTION, SOLUTION INTRAVENOUS at 06:24

## 2023-10-27 RX ADMIN — ROCURONIUM BROMIDE 40 MG: 50 INJECTION, SOLUTION INTRAVENOUS at 07:47

## 2023-10-27 RX ADMIN — Medication 100 MG: at 06:59

## 2023-10-27 RX ADMIN — PHENYLEPHRINE HYDROCHLORIDE 100 MCG: 10 INJECTION INTRAVENOUS at 07:16

## 2023-10-27 RX ADMIN — PROTAMINE SULFATE 50 MG: 10 INJECTION, SOLUTION INTRAVENOUS at 08:29

## 2023-10-27 ASSESSMENT — ACTIVITIES OF DAILY LIVING (ADL)
ADLS_ACUITY_SCORE: 35

## 2023-10-27 ASSESSMENT — ENCOUNTER SYMPTOMS: DYSRHYTHMIAS: 1

## 2023-10-27 NOTE — ANESTHESIA PREPROCEDURE EVALUATION
Anesthesia Pre-Procedure Evaluation    Patient: Joe Copeland   MRN: [id number] : 1960        Procedure : Procedure(s):  Ablation Atrial Fibrillation          Past Medical History:   Diagnosis Date    Hypertension     NO ACTIVE PROBLEMS       Past Surgical History:   Procedure Laterality Date    NO HISTORY OF SURGERY        Allergies   Allergen Reactions    Wasp Venom Protein Other (See Comments)     Swelling and soreness.     Seasonal Allergies       Social History     Tobacco Use    Smoking status: Never    Smokeless tobacco: Never   Substance Use Topics    Alcohol use: Yes     Alcohol/week: 28.0 standard drinks of alcohol     Types: 28 Standard drinks or equivalent per week     Comment: 4 drinks a day.      Wt Readings from Last 1 Encounters:   10/27/23 102.1 kg (225 lb)        Anesthesia Evaluation            ROS/MED HX  ENT/Pulmonary:  - neg pulmonary ROS     Neurologic:  - neg neurologic ROS     Cardiovascular:     (+)  hypertension- -   -  - -                        dysrhythmias,              METS/Exercise Tolerance:     Hematologic:  - neg hematologic  ROS     Musculoskeletal:       GI/Hepatic:  - neg GI/hepatic ROS     Renal/Genitourinary:  - neg Renal ROS     Endo:  - neg endo ROS     Psychiatric/Substance Use:  - neg psychiatric ROS     Infectious Disease:  - neg infectious disease ROS     Malignancy:       Other:            Physical Exam    Airway        Mallampati: II   TM distance: > 3 FB   Neck ROM: full     Respiratory Devices and Support         Dental       (+) Minor Abnormalities - some fillings, tiny chips      Cardiovascular   cardiovascular exam normal       Rhythm and rate: regular and normal     Pulmonary           breath sounds clear to auscultation           OUTSIDE LABS:  CBC:   Lab Results   Component Value Date    WBC 8.7 10/20/2023    WBC 7.0 2023    HGB 15.0 10/20/2023    HGB 14.7 2023    HCT 45.5 10/20/2023    HCT 45.0 2023     10/20/2023    PLT  "259 02/24/2023     BMP:   Lab Results   Component Value Date     10/20/2023     02/24/2023    POTASSIUM 5.1 10/20/2023    POTASSIUM 4.9 02/24/2023    CHLORIDE 103 10/20/2023    CHLORIDE 105 02/24/2023    CO2 22 10/20/2023    CO2 23 02/24/2023    BUN 16.5 10/20/2023    BUN 13.6 02/24/2023    CR 1.06 10/20/2023    CR 1.01 02/24/2023    GLC 86 10/20/2023    GLC 94 02/24/2023     COAGS: No results found for: \"PTT\", \"INR\", \"FIBR\"  POC: No results found for: \"BGM\", \"HCG\", \"HCGS\"  HEPATIC:   Lab Results   Component Value Date    ALBUMIN 4.0 02/24/2023    PROTTOTAL 6.9 02/24/2023    ALT 35 02/24/2023    AST 37 02/24/2023    ALKPHOS 85 02/24/2023    BILITOTAL 0.5 02/24/2023     OTHER:   Lab Results   Component Value Date    A1C 5.0 10/03/2019    TRAVIS 9.6 10/20/2023    MAG 2.2 02/24/2023    TSH 1.34 02/24/2023       Anesthesia Plan    ASA Status:  2    NPO Status:  NPO Appropriate    Anesthesia Type: General.   Induction: Intravenous.   Maintenance: Inhalation.        Consents            Postoperative Care    Pain management: IV analgesics.        Comments:                Ivette Redd MD  "

## 2023-10-27 NOTE — ANESTHESIA CARE TRANSFER NOTE
Patient: Joe Copeland    Procedure: Procedure(s):  Ablation Atrial Fibrillation       Diagnosis: Paroxysmal atrial fibrillation  Diagnosis Additional Information: No value filed.    Anesthesia Type:   No value filed.     Note:    Oropharynx: oropharynx clear of all foreign objects  Level of Consciousness: awake  Oxygen Supplementation: face mask    Independent Airway: airway patency satisfactory and stable  Dentition: dentition unchanged  Vital Signs Stable: post-procedure vital signs reviewed and stable  Report to RN Given: handoff report given  Patient transferred to: PACU    Handoff Report: Identifed the Patient, Identified the Reponsible Provider, Reviewed the pertinent medical history, Discussed the surgical course, Reviewed Intra-OP anesthesia mangement and issues during anesthesia, Set expectations for post-procedure period and Allowed opportunity for questions and acknowledgement of understanding      Vitals:  Vitals Value Taken Time   /79 10/27/23 0842   Temp     Pulse 93 10/27/23 0842   Resp 13 10/27/23 0842   SpO2 100 % 10/27/23 0842   Vitals shown include unfiled device data.    Electronically Signed By: CHRIS Cheatham CRNA  October 27, 2023  8:44 AM

## 2023-10-27 NOTE — PLAN OF CARE
Goal Outcome Evaluation:         Pt admitted for ablation of his atrial fibrillation. Pt is on xarelto and states he has not missed any doses. Pt prepped and ready for procedure. Wife Tere is here for support.      Luzmaria Perez RN

## 2023-10-27 NOTE — Clinical Note
[facility] system 12 lead EKG, hemodynamics 5 lead, pulse oximetery, NIBP, [employer] hands off defibrillator/external pacer, with 3 monitoring leads to patient. Baseline assessment done.

## 2023-10-27 NOTE — DISCHARGE INSTRUCTIONS
[facility]  Cardiac Electrophysiology  [address]  [place] [address]   Office: [phone]  Fax: [phone]       Cardiac Electrophysiology - Post Ablation Discharge Instructions      PROCEDURE   Atrial fibrillation ablation         MEDICATION INSTRUCTIONS   Continue taking your prior to procedure medications, including sotalol for now - we will assess for cessation at your 6 week follow-up visit.  Continue taking your blood thinner rivaroxaban (Xarelto).          DISCHARGE INSTRUCTIONS   Medications   Take your medications as prescribed.   It is important for you to continue your blood thinner without interruption, unless your electrophysiologist instructs you otherwise.     General instructions   Have an adult stay with you until tomorrow.   You may resume your normal diet.     You may shower tomorrow.  Do not take a bath, or use a hot tub or pool for at least 1 week.    Activity recommendations   Do not drive for 3 days.   Avoid stooping or squatting more than 90 degrees at the hips for 7 days.   Avoid repetitive motions such as loading , vacuuming, raking or shoveling for 7 days.   Avoid heavy lifting (greater than 25lbs) for 7 days.       Groin care instructions   For the first 24 hours after your ablation, check the groin access sites every 1-2 hours while awake.   You may keep a bandaid over the puncture sites for 1 or 2 days post-procedure and thereafter may keep these sites uncovered.  Change the bandaid daily.  If there is minor oozing, apply another bandaid and remove it after 12 hours.   For 2 days, when you cough, sneeze, laugh or move your bowels, hold your hand over the puncture sites and press firmly.   Do not scrub the groin access sites.   Do not use lotion or powder near the groin access sites.       Arrhythmias following ablation  Recurrent atrial fibrillation and palpitations are common within the first 3 months post ablation while your  heart recovers from the procedure.  These are usually more frequent in the first few weeks following ablation and should occur less frequently over time.  Please contact us if you are having frequent or long lasting atrial fibrillation episodes following ablation.    Common findings after ablation  Bruising and a dime or pea size lump at the access sites is common.  If you notice increased swelling, external bleeding, or have other concerns regarding your groin access sites please call your electrophysiology team's office, and if after hours consider emergency department evaluation.   Soreness and mild pain at your groin sites is normal, to help relieve this pain you can apply ice/cold packs to the sites for 20min 3-4 times per day.   Pleuritic chest discomfort (chest pain worse with taking deep breaths, worse with laying flat on your back) can occur after ablation, usually coming about within the first 24-72hrs post ablation.  If this occurs and is severe enough to be troublesome to you, please call us and consider starting a course of ibuprofen 400mg three times daily for 5 to 7 days.     Things to watch for   As with any type of procedure, please be more attentive to unusual symptoms post ablation (eg. fever, neurologic changes, pain with swallowing, loss of consciousness, etc) - we recommend ER evaluation for any such symptoms in the first few weeks post procedure.       Contact the EP Nurse line with any of the following.  Contact the cardiology on-call number after business hours.    Consider ER evaluation for severe symptoms.   Groin pain, swelling, or growing hard lump around the puncture sites.   Groin redness, tenderness, swelling, or drainage (blood or pus).   Neurological changes (for example: leg, arm or face weakness or numbness, difficulties with speech or word finding, problems walking or with your balance, vision changes).   Any numbness, coolness or changes in color in your extremities.  Sudden  onset severe abdominal or back pain.  Moderate or severe chest pain not relieved by Tylenol or Ibuprofen, particularly after the first 48 hours.   Shortness of breath.   Chills or fever greater than 100 F.   Difficulty swallowing food or liquids.  Coughing up blood.   Blood in your stool.  Nausea and vomiting.  Difficulty urinating.  Recurrent atrial fibrillation associated with prolonged rapid heart rates (for example, heart rates over 140bpm for greater than 4 hours) or associated with additional concerning symptoms (for example, chest pain, lightheadedness, loss of consciousness, sweating).     If you are being evaluated at an emergency department, please tell your ER doctor that you have recently underwent an atrial fibrillation ablation and ask the ER to contact our office.  Many special considerations apply following ablation - these may be overlooked during an ER evaluation.      Our office will have a follow-up visit scheduled for you in approximately 6 weeks.  Please do not hesitate to call us before that time should issues arise.         [facility]      To reach the  nurses working with Dr. Jenkins:   [phone]        To reach the general [facility] line or after hours service:   [phone]   If you are calling after hours, please listen to the entire voicemail,    a live  will answer at the end of the message.

## 2023-10-27 NOTE — ANESTHESIA PROCEDURE NOTES
Airway       Patient location during procedure: OR       Procedure Start/Stop Times: 10/27/2023 7:01 AM  Staff -        CRNA: Yudith Black APRN CRNA       Performed By: CRNA  Consent for Airway        Urgency: elective  Indications and Patient Condition       Indications for airway management: laverne-procedural       Induction type:RSI       Mask difficulty assessment: 0 - not attempted    Final Airway Details       Final airway type: endotracheal airway       Successful airway: ETT - single and Oral  Endotracheal Airway Details        ETT size (mm): 8.0       Cuffed: yes       Successful intubation technique: direct laryngoscopy       DL Blade Type: MAC 4       Grade View of Cords: 1       Adjucts: stylet       Position: Right       Measured from: gums/teeth       Secured at (cm): 24       Bite block used: None    Post intubation assessment        Placement verified by: capnometry, equal breath sounds and chest rise        Number of attempts at approach: 1       Secured with: silk tape       Ease of procedure: easy       Dentition: Intact and Unchanged       Dental guard used and removed. Dental Guard Type: Proguard Red.    Medication(s) Administered   Medication Administration Time: 10/27/2023 7:01 AM

## 2023-10-27 NOTE — INTERVAL H&P NOTE
"I have reviewed the surgical (or preoperative) H&P that is linked to this encounter, and examined the patient. There are no significant changes    Clinical Conditions Present on Arrival:  Clinically Significant Risk Factors Present on Admission                # Drug Induced Coagulation Defect: home medication list includes an anticoagulant medication   # Overweight: Estimated body mass index is 28.12 kg/m  as calculated from the following:    Height as of this encounter: 1.905 m (6' 3\").    Weight as of this encounter: 102.1 kg (225 lb).       "

## 2023-10-27 NOTE — Clinical Note
Arrhythmia Type: atrial fibrillation.   Method of Cardioversion: synchronous.   The arrhythmia was terminated.   Energy shock delivered: 250 joules.   Time shock delivered: 08:26 CDT.   Post cardioversion rhythm: sinus rhythm.   No early return to atrial fibrillation (ERAF). No immediate return to atrial fibrillation (IRAF).

## 2023-10-27 NOTE — PLAN OF CARE
Patient has had a stable phase I recovery and meets criteria to transition to phase II. Denies pain, VSS and able to make needs known. Right femoral venous site is CDI. Wife at bedside and pt has tolerated water/ice chips and juice. Report given to Drake FOSTER and site handoff completed at bedside.  Keira Rey RN

## 2023-10-27 NOTE — ANESTHESIA POSTPROCEDURE EVALUATION
Patient: Joe Copeland    Procedure: Procedure(s):  Ablation Atrial Fibrillation       Anesthesia Type:  General    Note:  Disposition: Inpatient   Postop Pain Control: Uneventful            Sign Out: Well controlled pain   PONV: No   Neuro/Psych: Uneventful            Sign Out: Acceptable/Baseline neuro status   Airway/Respiratory: Uneventful            Sign Out: Acceptable/Baseline resp. status   CV/Hemodynamics: Uneventful            Sign Out: Acceptable CV status; No obvious hypovolemia; No obvious fluid overload   Other NRE: NONE   DID A NON-ROUTINE EVENT OCCUR? No           Last vitals:  Vitals Value Taken Time   /75 10/27/23 1015   Temp 36.6  C (97.9  F) 10/27/23 0842   Pulse 83 10/27/23 1025   Resp 11 10/27/23 1025   SpO2 97 % 10/27/23 1025   Vitals shown include unfiled device data.    Electronically Signed By: Ivette Redd MD  October 27, 2023  10:27 AM

## 2023-10-30 ENCOUNTER — VIRTUAL VISIT (OUTPATIENT)
Dept: CARDIOLOGY | Facility: CLINIC | Age: 63
End: 2023-10-30
Payer: COMMERCIAL

## 2023-10-30 DIAGNOSIS — I48.0 PAROXYSMAL ATRIAL FIBRILLATION (H): Primary | ICD-10-CM

## 2023-10-30 PROCEDURE — 99207 PR NO CHARGE NURSE ONLY: CPT

## 2023-10-30 NOTE — PATIENT INSTRUCTIONS
Your anticoagulation medication Xarelto:  It is important to remain on your anticoagulation medication uninterrupted after your ablation to reduce your risk of a stroke or heart attack, do not stop this medication  Please contact me if you have any questions regarding your anticoagulation medication    Healing from your pulmonary vein ablation:  Stay well hydrated, and increase your fluid intake during this recovery period  High protein foods aide in your bodies healing process  No aggressive or aerobic activity for 7-10 days, and do not lift more than 25 pounds for 7 days   Increase your activity gradually over the next 5-10 days, working back to your normal daily activity  If you are experiencing pain at your groin sites from the procedure, we advise applying ice for 20 minute durations 3-4 times per day     Please call me if any of the following occur:  Episodes of Atrial Fibrillation lasting greater than 4 hours, or if you notice the episodes are increasing in frequency or duration  If you develop shortness of breath, dizziness, or unresolving chest pains   Changes at your groin sites including swelling, hardening, drainage, increase in bruising, or an increase in pain  If you develop a temperature greater than 100.5 degrees (especially weeks 2-5 post   Procedure)    Call 911 if you are having symptoms of a stroke; difficulty with your speech, problems walking, difficulty with balance, vision disturbances, facial drooping or numbness, and muscle weakness on one side of your body     Your follow up appointments are as follows:  You will be seen by the electrophysiologist nurse practitioner at 6 weeks after your ablation  At your 6 week appointment, a 3 month follow-up appointment will be arranged with either the Nurse Practitioner or the Electrophysiology provider     Sincerely,  Celia Mendiola RN [phone]    After hours please contact the on call service at # [phone]

## 2023-10-30 NOTE — PROGRESS NOTES
Post PVI Procedural Follow Up Call     Pt is s/p PVI from 10/27 with Dr Jenkins  PC was placed to pt, spoke to pt     General Assessment:      Weight: Pt reports pt is unable to report weight, but denies s/s of fluid retention     Pain: Pt denies generalized or localized pain abnormal to healing s/p      /GI: Pt denies difficulty swallowing, denies constipation, denies urinary retention/difficulty, reports no s/s of infection, report normal appetite, and reports staying hydrated.     Respiratory: Pt denies SOB, denies difficulty breathing, denies throat pain, denies changes/abnormal sputum, and denies any further symptoms abnormal to normal healing process s/p PVI.     Activity: Pt is tolerating advancement in activity while following physical restrictions, staying well hydrated, and gradually working into baseline activity.      Rhythm Assessment:   Pt denies palpitations, denies irregularities in HR or rhythm, and denies symptoms or sustained AF episodes.     Procedure Site Assessment:   Pts no visible/physical changes in groin sites     Anticoagulation/Medication:  Pt remain on Xarelto without interruption  Per guidelines by Dr Jenkins no ASA needed upon discharge     Education completed with pt at this visit:  Reviewed normal post-op PVI healing process, when to contact EP-RN/EP-MD, contact information was given to the pt for further concerns or questions, and pt verbalized understanding     Follow up  Pts AVS was printed and mailed to pt by scheduling team and pt will be seen by EP NP at 6 wks, monitor will be ordered at this follow-up if indicated as well as 3mo follow-up with either EP MINERVA or BRENDON NIX     10/30/2023 9:14 AM  Celia Mendiola RN

## 2023-11-30 PROBLEM — Z98.890 S/P ABLATION OF ATRIAL FIBRILLATION: Status: ACTIVE | Noted: 2023-11-30

## 2023-11-30 PROBLEM — Z86.79 S/P ABLATION OF ATRIAL FIBRILLATION: Status: ACTIVE | Noted: 2023-11-30

## 2023-11-30 NOTE — PROGRESS NOTES
Thank you, Dr. Jenkins, for asking the [facility] team to see Mr. Joe Copeland to evaluate 6 weeks post ablation.    Assessment/Recommendations     Assessment/Plan:    Diagnoses and all orders for this visit:  Paroxysmal atrial fibrillation (H)  S/P ablation of atrial fibrillation  -Atrial fibrillation ablation with pulmonary vein isolation 10/27/23 performed by Dr Jenkins  -No sustained episodes of recurrence since his ablation so we will discontinue sotalol starting today on 12/1/2023.  -He will continue to monitor his rhythm and rate using his Apple Watch and/or [employer] mobile radha, he was instructed to call the clinic if he has any prolonged episodes of atrial fibrillation lasting longer than 4 hours.  -Continue with Xarelto for an additional 6 weeks      Benign essential hypertension  -BP at goal 114/70, continue with current medication regimen which includes Lisinopril 10 mg daily    He has a [id number] score of 1 for HTN.  HAS-BLED score of 0.   Start Xarelto 7 days prior to ablation on 10/20/23 and continue for 3 months post ablation for stroke prophylaxis.  He reports no missed doses or bleeding issues.       Follow up in 6 weeks with Modesta Meek NP for 3 months post ablation.     History of Present Illness/Subjective     Joe Copeland is a very pleasant 63 year old male who comes in today for EP follow up 6 weeks post ablation    Joe Copeland is a 62 year old male with paroxysmal atrial fibrillation, typical atrial flutter with prior CTI ablation 2018, HTN      Arrhythmia hx  Dx/date: diagnosed with paroxysmal atrial fibrillation later in 2018, He is very active and exercises routinely. He has an older brother with AF.    Sx: - he denies chest pain, unexpected dyspnea when not in AF  Prior AAD, AV wilber blocking agents: maintained on sotalol 40mg twice daily after he was diagnosed. Due to progressive atrial fibrillation episodes, sotalol was increased to  60mg twice daily in early 2023 - he notes progressive breakthrough episodes (presently 2-3 episodes/week, 1-3hr episodes).  He was seen by Dr. Mj Monge 3/10/2023.  He has been using a Kardia.    Procedures  DCCV: no  Ablation:  atrial flutter and underwent CTI ablation in 2018 by Dr. Mullen.  Atrial fibrillation ablation with pulmonary vein isolation 10/27/23 performed by Dr Jenkins     Patient presents to the [facility] today for his 6-week post ablation follow-up. Atrial fibrillation ablation with pulmonary vein isolation 10/27/23 performed by Dr Jenkins.  He remains on sotalol 60 mg twice daily post ablation. He does monitor his rhythm and rate using his Apple Watch and [employer] mobile radha.  He has had short bursts of atrial fibrillation starting 10 days post ablation when he introduced exercise back into his regular routine, he states his heart rate increased up to the 130s but he remained asymptomatic.  He has had no significant recurrence of atrial fibrillation since postop day 14.  Blood pressure remains normotensive today, 114/70.  He remains on Xarelto daily, he denies any recurrent nosebleeds, hematuria or rectal bleeding.  He is anxious to get off the Xarelto due to his high co-pay, he currently has a CHADS2 Vascor of 1 due to hypertension.  He is remaining well-hydrated throughout the day, caffeine intake is between 2 and 3 cups/day, he does consume 2 alcoholic beverages per day but this does not trigger his arrhythmia.  He is a non-smoker        Cardiographics (reviewed):    10/27/23 normal sinus rhythm rate 87 bpm QT/QTc 398/478 ms      10/20/23 normal sinus rhythm ventricular rate 74 bpm QRS duration 100 ms QT/QTc 396/439 ms       3/10/2023 - SR 64bpm, QT/QTc 420/433ms     8/6/2018 atrial flutter          7/19/23  ECHO  1. Normal left ventricular size and systolic performance with a visually  estimated ejection fraction of 55-60%.  2. No significant valvular heart disease is identified on  this study.  3. Normal right ventricular size and systolic performance.                 Problem List:  Patient Active Problem List   Diagnosis    Paroxysmal atrial fibrillation (H)    Benign essential hypertension    Atrial flutter (H)    Hayfever    Acne rosacea    S/P ablation of atrial fibrillation     Revi  e  Physical Examination Review of Systems   w fystems  There were no vitals taken for this visit.  There is no height or weight on file to calculate BMI.  Wt Readings from Last 3 Encounters:   10/27/23 102.1 kg (225 lb)   10/20/23 102.1 kg (225 lb)   05/23/23 105.7 kg (233 lb)     General Appearance:   Alert, well-appearing and in no acute distress.   HEENT: Atraumatic, normocephalic.  No scleral icterus, normal conjunctivae; mucous membranes pink and moist.     Chest: Chest symmetric, spine straight.   Lungs:   Respirations unlabored: Lungs are clear to auscultation.   Cardiovascular:   Normal first and second heart sounds with no murmurs, rubs, or gallops.  Regular, regular.   Normal JVD, no edema.       Extremities: No cyanosis or clubbing   Musculoskeletal: Moves all extremities   Skin: Warm, dry, intact.    Neurologic: Mood and affect are appropriate, alert and oriented to person, place, time, and situation     ROS: 10 point ROS neg other than the symptoms noted above in the HPI.     Medical History  Surgical History Family History Social History     Past Medical History:   Diagnosis Date    Hypertension     NO ACTIVE PROBLEMS     Past Surgical History:   Procedure Laterality Date    EP ABLATION PULMONARY VEIN ISOLATION N/A 10/27/2023    Procedure: Ablation Atrial Fibrillation;  Surgeon: Paul Jenkins MD;  Location: [facility] CV    NO HISTORY OF SURGERY      Family History   Problem Relation Age of Onset    Hypertension Mother     Pancreatic Cancer Mother     Thyroid Disease Mother     Leukemia Father     Leukemia Sister 2    Thyroid Disease Sister     Cerebrovascular Disease Brother 67     Skin Cancer Brother     Atrial fibrillation Brother     No Known Problems Brother     Diabetes No family hx of     Coronary Artery Disease No family hx of     Breast Cancer No family hx of     Colon Cancer No family hx of     Prostate Cancer No family hx of     Asthma No family hx of     History   Smoking Status    Never   Smokeless Tobacco    Never     Social History    Substance and Sexual Activity      Alcohol use: Yes        Alcohol/week: 28.0 standard drinks of alcohol        Types: 28 Standard drinks or equivalent per week        Comment: 4 drinks a day.       Medications  Allergies     Current Outpatient Medications   Medication Sig Dispense Refill    fluticasone (FLONASE) 50 MCG/ACT nasal spray Spray 1-2 sprays into both nostrils daily 11.1 mL 3    lisinopril (ZESTRIL) 10 MG tablet Take 1 tablet (10 mg) by mouth daily 90 tablet 3    loratadine (CLARITIN) 10 MG tablet Take 10 mg by mouth daily      metroNIDAZOLE (METROCREAM) 0.75 % external cream Apply topically 2 times daily 45 g 1    minocycline (MINOCIN) 50 MG capsule Take 1 capsule (50 mg) by mouth 2 times daily 60 capsule 2    pantoprazole (PROTONIX) 40 MG EC tablet Take 1 tablet (40 mg) by mouth daily 45 tablet 0    rivaroxaban ANTICOAGULANT (XARELTO) 20 MG TABS tablet Take 1 tablet (20 mg) by mouth daily (with dinner) 90 tablet 3    sotalol (BETAPACE) 120 MG tablet Take 0.5 tablets (60 mg) by mouth 2 times daily 90 tablet 1      Allergies   Allergen Reactions    Wasp Venom Protein Other (See Comments)     Swelling and soreness.     Seasonal Allergies       Medical, surgical, family, social history, and medications were all reviewed and updated as necessary.   Lab Results    Chemistry/lipid CBC Cardiac Enzymes/BNP/TSH/INR   Recent Labs   Lab Test 05/03/21  0923   CHOL 221.0*   HDL 66.0   .0*   TRIG 147.0   CHOLHDLRATIO 3.4     Recent Labs   Lab Test 05/03/21  0923 10/03/19  1356 08/21/17  0841   .0* 129.0* 136.0*     Recent Labs   Lab  "Test 10/20/23  1321      POTASSIUM 5.1   CHLORIDE 103   CO2 22   GLC 86   BUN 16.5   CR 1.06   GFRESTIMATED 79   TRAVIS 9.6     Recent Labs   Lab Test 10/20/23  1321 02/24/23  1033 05/03/21  0923   CR 1.06 1.01 1.0     Recent Labs   Lab Test 10/03/19  1356   A1C 5.0          Recent Labs   Lab Test 10/20/23  1321   WBC 8.7   HGB 15.0   HCT 45.5   MCV 95        Recent Labs   Lab Test 10/20/23  1321 02/24/23  1033 10/16/17  1053   HGB 15.0 14.7 14.7    No results for input(s): \"TROPONINI\" in the last 47663 hours.  No results for input(s): \"BNP\", \"NTBNPI\", \"NTBNP\" in the last 10048 hours.  Recent Labs   Lab Test 02/24/23  1033   TSH 1.34     No results for input(s): \"INR\" in the last 34479 hours.       Total Time- 30 minutes spent on date of encounter doing chart review, history and exam, documentation and further activities as noted above.  This note has been dictated using voice recognition software. Any grammatical, typographical, or context distortions are unintentional and inherent to the software.    Modesta Meek [facility]  [phone]                       "

## 2023-12-01 ENCOUNTER — OFFICE VISIT (OUTPATIENT)
Dept: CARDIOLOGY | Facility: CLINIC | Age: 63
End: 2023-12-01
Payer: COMMERCIAL

## 2023-12-01 VITALS
RESPIRATION RATE: 16 BRPM | WEIGHT: 228 LBS | BODY MASS INDEX: 28.5 KG/M2 | DIASTOLIC BLOOD PRESSURE: 70 MMHG | OXYGEN SATURATION: 99 % | HEART RATE: 60 BPM | SYSTOLIC BLOOD PRESSURE: 114 MMHG

## 2023-12-01 DIAGNOSIS — I10 BENIGN ESSENTIAL HYPERTENSION: ICD-10-CM

## 2023-12-01 DIAGNOSIS — Z86.79 S/P ABLATION OF ATRIAL FIBRILLATION: ICD-10-CM

## 2023-12-01 DIAGNOSIS — I48.0 PAROXYSMAL ATRIAL FIBRILLATION (H): Primary | ICD-10-CM

## 2023-12-01 DIAGNOSIS — Z98.890 S/P ABLATION OF ATRIAL FIBRILLATION: ICD-10-CM

## 2023-12-01 PROCEDURE — 99214 OFFICE O/P EST MOD 30 MIN: CPT | Performed by: NURSE PRACTITIONER

## 2023-12-01 NOTE — PATIENT INSTRUCTIONS
Joe Copeland,    It was a pleasure to see you today at the [facility].     My recommendations after this visit include:    Stop Sotalol and finish up your Protonix  You will need to remain on Xarelto for another 6 weeks then will be able to discontinue once you are 3 months post ablation  I will see you back in clinic again in 6 weeks for your next follow up appointment  Continue to monitor using Apple watch and [employer] as you have been    Modesta Meek CNP  [facility], Electrophysiology  [phone]  EP nurses [phone]

## 2023-12-01 NOTE — LETTER
12/1/2023    Daljit Ryan MD  [address] [facility] [address]    RE: Joe Copeland       Dear Colleague,     I had the pleasure of seeing Joe Copeland in the [facility].    Thank you, Dr. Jenkins, for asking the [facility] team to see Mr. Joe Copeland to evaluate 6 weeks post ablation.    Assessment/Recommendations     Assessment/Plan:    Diagnoses and all orders for this visit:  Paroxysmal atrial fibrillation (H)  S/P ablation of atrial fibrillation  -Atrial fibrillation ablation with pulmonary vein isolation 10/27/23 performed by Dr Jenkins  -No sustained episodes of recurrence since his ablation so we will discontinue sotalol starting today on 12/1/2023.  -He will continue to monitor his rhythm and rate using his Apple Watch and/or [employer] mobile radha, he was instructed to call the clinic if he has any prolonged episodes of atrial fibrillation lasting longer than 4 hours.  -Continue with Xarelto for an additional 6 weeks      Benign essential hypertension  -BP at goal 114/70, continue with current medication regimen which includes Lisinopril 10 mg daily    He has a [id number] score of 1 for HTN.  HAS-BLED score of 0.   Start Xarelto 7 days prior to ablation on 10/20/23 and continue for 3 months post ablation for stroke prophylaxis.  He reports no missed doses or bleeding issues.       Follow up in 6 weeks with Modesta Meek NP for 3 months post ablation.     History of Present Illness/Subjective     Joe Copeland is a very pleasant 63 year old male who comes in today for EP follow up 6 weeks post ablation    Joe Copeland is a 62 year old male with paroxysmal atrial fibrillation, typical atrial flutter with prior CTI ablation 2018, HTN      Arrhythmia hx  Dx/date: diagnosed with paroxysmal atrial fibrillation later in 2018, He is very active and exercises routinely. He has an older brother with AF.    Sx: - he denies  chest pain, unexpected dyspnea when not in AF  Prior AAD, AV wilber blocking agents: maintained on sotalol 40mg twice daily after he was diagnosed. Due to progressive atrial fibrillation episodes, sotalol was increased to 60mg twice daily in early 2023 - he notes progressive breakthrough episodes (presently 2-3 episodes/week, 1-3hr episodes).  He was seen by Dr. Mj Monge 3/10/2023.  He has been using a Kardia.    Procedures  DCCV: no  Ablation:  atrial flutter and underwent CTI ablation in 2018 by Dr. Mullen.  Atrial fibrillation ablation with pulmonary vein isolation 10/27/23 performed by Dr Jenkins     Patient presents to the [facility] today for his 6-week post ablation follow-up. Atrial fibrillation ablation with pulmonary vein isolation 10/27/23 performed by Dr Jenkins.  He remains on sotalol 60 mg twice daily post ablation. He does monitor his rhythm and rate using his Apple Watch and [employer] mobile radha.  He has had short bursts of atrial fibrillation starting 10 days post ablation when he introduced exercise back into his regular routine, he states his heart rate increased up to the 130s but he remained asymptomatic.  He has had no significant recurrence of atrial fibrillation since postop day 14.  Blood pressure remains normotensive today, 114/70.  He remains on Xarelto daily, he denies any recurrent nosebleeds, hematuria or rectal bleeding.  He is anxious to get off the Xarelto due to his high co-pay, he currently has a CHADS2 Vascor of 1 due to hypertension.  He is remaining well-hydrated throughout the day, caffeine intake is between 2 and 3 cups/day, he does consume 2 alcoholic beverages per day but this does not trigger his arrhythmia.  He is a non-smoker        Cardiographics (reviewed):    10/27/23 normal sinus rhythm rate 87 bpm QT/QTc 398/478 ms      10/20/23 normal sinus rhythm ventricular rate 74 bpm QRS duration 100 ms QT/QTc 396/439 ms       3/10/2023 - SR 64bpm, QT/QTc 420/433ms      8/6/2018 atrial flutter          7/19/23  ECHO  1. Normal left ventricular size and systolic performance with a visually  estimated ejection fraction of 55-60%.  2. No significant valvular heart disease is identified on this study.  3. Normal right ventricular size and systolic performance.                 Problem List:  Patient Active Problem List   Diagnosis    Paroxysmal atrial fibrillation (H)    Benign essential hypertension    Atrial flutter (H)    Hayfever    Acne rosacea    S/P ablation of atrial fibrillation     Revi  e  Physical Examination Review of Systems   w [facility]  There were no vitals taken for this visit.  There is no height or weight on file to calculate BMI.  Wt Readings from Last 3 Encounters:   10/27/23 102.1 kg (225 lb)   10/20/23 102.1 kg (225 lb)   05/23/23 105.7 kg (233 lb)     General Appearance:   Alert, well-appearing and in no acute distress.   HEENT: Atraumatic, normocephalic.  No scleral icterus, normal conjunctivae; mucous membranes pink and moist.     Chest: Chest symmetric, spine straight.   Lungs:   Respirations unlabored: Lungs are clear to auscultation.   Cardiovascular:   Normal first and second heart sounds with no murmurs, rubs, or gallops.  Regular, regular.   Normal JVD, no edema.       Extremities: No cyanosis or clubbing   Musculoskeletal: Moves all extremities   Skin: Warm, dry, intact.    Neurologic: Mood and affect are appropriate, alert and oriented to person, place, time, and situation     ROS: 10 point ROS neg other than the symptoms noted above in the HPI.     Medical History  Surgical History Family History Social History     Past Medical History:   Diagnosis Date    Hypertension     NO ACTIVE PROBLEMS     Past Surgical History:   Procedure Laterality Date    EP ABLATION PULMONARY VEIN ISOLATION N/A 10/27/2023    Procedure: Ablation Atrial Fibrillation;  Surgeon: Paul Jenkins MD;  Location: [facility] CATH LAB CV    NO HISTORY OF SURGERY      Family  History   Problem Relation Age of Onset    Hypertension Mother     Pancreatic Cancer Mother     Thyroid Disease Mother     Leukemia Father     Leukemia Sister 2    Thyroid Disease Sister     Cerebrovascular Disease Brother 67    Skin Cancer Brother     Atrial fibrillation Brother     No Known Problems Brother     Diabetes No family hx of     Coronary Artery Disease No family hx of     Breast Cancer No family hx of     Colon Cancer No family hx of     Prostate Cancer No family hx of     Asthma No family hx of     History   Smoking Status    Never   Smokeless Tobacco    Never     Social History    Substance and Sexual Activity      Alcohol use: Yes        Alcohol/week: 28.0 standard drinks of alcohol        Types: 28 Standard drinks or equivalent per week        Comment: 4 drinks a day.       Medications  Allergies     Current Outpatient Medications   Medication Sig Dispense Refill    fluticasone (FLONASE) 50 MCG/ACT nasal spray Spray 1-2 sprays into both nostrils daily 11.1 mL 3    lisinopril (ZESTRIL) 10 MG tablet Take 1 tablet (10 mg) by mouth daily 90 tablet 3    loratadine (CLARITIN) 10 MG tablet Take 10 mg by mouth daily      metroNIDAZOLE (METROCREAM) 0.75 % external cream Apply topically 2 times daily 45 g 1    minocycline (MINOCIN) 50 MG capsule Take 1 capsule (50 mg) by mouth 2 times daily 60 capsule 2    pantoprazole (PROTONIX) 40 MG EC tablet Take 1 tablet (40 mg) by mouth daily 45 tablet 0    rivaroxaban ANTICOAGULANT (XARELTO) 20 MG TABS tablet Take 1 tablet (20 mg) by mouth daily (with dinner) 90 tablet 3    sotalol (BETAPACE) 120 MG tablet Take 0.5 tablets (60 mg) by mouth 2 times daily 90 tablet 1      Allergies   Allergen Reactions    Wasp Venom Protein Other (See Comments)     Swelling and soreness.     Seasonal Allergies       Medical, surgical, family, social history, and medications were all reviewed and updated as necessary.   Lab Results    Chemistry/lipid CBC Cardiac Enzymes/BNP/TSH/INR  "  Recent Labs   Lab Test 05/03/21  0923   CHOL 221.0*   HDL 66.0   .0*   TRIG 147.0   CHOLHDLRATIO 3.4     Recent Labs   Lab Test 05/03/21  0923 10/03/19  1356 08/21/17  0841   .0* 129.0* 136.0*     Recent Labs   Lab Test 10/20/23  1321      POTASSIUM 5.1   CHLORIDE 103   CO2 22   GLC 86   BUN 16.5   CR 1.06   GFRESTIMATED 79   TRAVIS 9.6     Recent Labs   Lab Test 10/20/23  1321 02/24/23  1033 05/03/21  0923   CR 1.06 1.01 1.0     Recent Labs   Lab Test 10/03/19  1356   A1C 5.0          Recent Labs   Lab Test 10/20/23  1321   WBC 8.7   HGB 15.0   HCT 45.5   MCV 95        Recent Labs   Lab Test 10/20/23  1321 02/24/23  1033 10/16/17  1053   HGB 15.0 14.7 14.7    No results for input(s): \"TROPONINI\" in the last 67089 hours.  No results for input(s): \"BNP\", \"NTBNPI\", \"NTBNP\" in the last 82256 hours.  Recent Labs   Lab Test 02/24/23  1033   TSH 1.34     No results for input(s): \"INR\" in the last 81807 hours.       Total Time- 30 minutes spent on date of encounter doing chart review, history and exam, documentation and further activities as noted above.  This note has been dictated using voice recognition software. Any grammatical, typographical, or context distortions are unintentional and inherent to the software.    Modesta Meek CNP  [facility]  [phone]                         Thank you for allowing me to participate in the care of your patient.      Sincerely,     Modesta Meek NP     [facility]  cc:   Paul Jenkins MD  [address]  [place] [address]      "

## 2024-01-06 ENCOUNTER — TRANSFERRED RECORDS (OUTPATIENT)
Dept: MULTI SPECIALTY CLINIC | Facility: CLINIC | Age: 64
End: 2024-01-06

## 2024-01-25 NOTE — PROGRESS NOTES
Thank you, Dr. Jenkins, for asking the [facility] team to see . Joe Copeland to evaluate 3 months post ablation.    Assessment/Recommendations     Assessment/Plan:    Diagnoses and all orders for this visit:  Paroxysmal atrial fibrillation (H)  S/P ablation of atrial fibrillation  -Dx/date: diagnosed with paroxysmal atrial fibrillation in 2018  -Sx: - he denies chest pain, unexpected dyspnea when not in AF  -10/27/23 Atrial fibrillation ablation with pulmonary vein isolation Dr Jenkins  -He notes no recurrence of atrial fibrillation post ablation, resting heart rate in clinic today is between 85 and 97 bpm.  -He admits today that he did attend a dinner party last night and consumed multiple alcoholic beverages so he is feeling dehydrated today. Typically his resting heart rates are under 85 bpm.    -He monitors HR and rhythm regularly using Apple Watch  -No ER visits since ablation    Benign essential hypertension  -/84, borderline controlled. Continue on current dose of Lisinopril 10 mg daily, continue to monitor BP at home.     He has a [id number] score of 1 for HTN.  HAS-BLED score of 0.  He stopped taking his Xarelto on 1/16/24.  Not taking ASA.       Follow up in 9 months with Modesta Meek CNP one year post ablation. He does not have a general cardiologist and prefers to see EP only.      History of Present Illness/Subjective     Joe Copeland is a very pleasant 63 year old male who comes in today for EP follow up 6 weeks post ablation.       Joe Copeland is a 62 year old male with paroxysmal atrial fibrillation, typical atrial flutter with prior CTI ablation 2018, HTN      Arrhythmia hx  Dx/date: diagnosed with paroxysmal atrial fibrillation later in 2018, He is very active and exercises routinely. He has an older brother with AF.    Sx: - he denies chest pain, unexpected dyspnea when not in AF  Prior AAD, AV wilber blocking agents: maintained on  sotalol 40mg twice daily after he was diagnosed. Due to progressive atrial fibrillation episodes, sotalol was increased to 60mg twice daily in early 2023 - he notes progressive breakthrough episodes (presently 2-3 episodes/week, 1-3hr episodes).  He was seen by Dr. Mj Monge 3/10/2023.  He has been using a Kardia.    Procedures  DCCV: no  Ablation:  atrial flutter and underwent CTI ablation in 2018 by Dr. Mullen.  10/27/23 Atrial fibrillation ablation with pulmonary vein isolation Dr Gregory Lott is doing very well 3 months post ablation.  He stopped taking his Xarelto about 10 days ago.  He is not currently on beta-blocker or antiarrhythmic medications, his sotalol was discontinued 6 weeks post ablation.  He is monitoring his heart rate and rhythm using his Apple Watch regularly.  He has had no recurrence of A-fib post ablation.  He is very active, exercises regularly performing both aerobic and weightlifting.  His aerobic heart rate tends to run between 128 and 129 bpm.  While biking, tends to extend into the 140s and while cross-country skiing the other day, his max heart rate reached 174 bpm.  During recovery after skiing, his heart rate came down to 100 bpm and stayed there for 2 to 3 hours which was concerning to him.  He was asymptomatic denying chest pain, palpitations, shortness of breath, lightheadedness or dizziness or presyncope.  His resting heart rate in clinic today is between 85 and 97 bpm per Apple Watch readings.  He admits that he was at a dinner party last night and consumed multiple alcoholic beverages so he is feeling a bit dehydrated today.    Cardiographics (reviewed):  10/20/23 normal sinus rhythm ventricular rate 74 bpm QRS duration 100 ms QT/QTc 396/439 ms   3/10/2023 - SR 64bpm, QT/QTc 420/433ms   8/6/2018 atrial flutter 131 bpm    7/19/23  ECHO  1. Normal left ventricular size and systolic performance with a visually  estimated ejection fraction of 55-60%.  2. No significant  valvular heart disease is identified on this study.  3. Normal right ventricular size and systolic performance.         Problem List:  Patient Active Problem List   Diagnosis    Paroxysmal atrial fibrillation (H)    Benign essential hypertension    Atrial flutter (H)    Hayfever    Acne rosacea    S/P ablation of atrial fibrillation     Revi  e  Physical Examination Review of Systems   w stems  There were no vitals taken for this visit.  There is no height or weight on file to calculate BMI.  Wt Readings from Last 3 Encounters:   12/01/23 103.4 kg (228 lb)   10/27/23 102.1 kg (225 lb)   10/20/23 102.1 kg (225 lb)     General Appearance:   Alert, well-appearing and in no acute distress.   HEENT: Atraumatic, normocephalic.  No scleral icterus, normal conjunctivae; mucous membranes pink and moist.     Chest: Chest symmetric, spine straight.   Lungs:   Respirations unlabored: Lungs are clear to auscultation.   Cardiovascular:   Normal first and second heart sounds with no murmurs, rubs, or gallops.  Regular, regular.   Normal JVD, no edema.       Extremities: No cyanosis or clubbing   Musculoskeletal: Moves all extremities   Skin: Warm, dry, intact.    Neurologic: Mood and affect are appropriate, alert and oriented to person, place, time, and situation     ROS: 10 point ROS neg other than the symptoms noted above in the HPI.     Medical History  Surgical History Family History Social History     Past Medical History:   Diagnosis Date    Hypertension     NO ACTIVE PROBLEMS     Past Surgical History:   Procedure Laterality Date    EP ABLATION PULMONARY VEIN ISOLATION N/A 10/27/2023    Procedure: Ablation Atrial Fibrillation;  Surgeon: Paul Jenkins MD;  Location: [facility]    NO HISTORY OF SURGERY      Family History   Problem Relation Age of Onset    Hypertension Mother     Pancreatic Cancer Mother     Thyroid Disease Mother     Leukemia Father     Leukemia Sister 2    Thyroid Disease Sister      Cerebrovascular Disease Brother 67    Skin Cancer Brother     Atrial fibrillation Brother     No Known Problems Brother     Diabetes No family hx of     Coronary Artery Disease No family hx of     Breast Cancer No family hx of     Colon Cancer No family hx of     Prostate Cancer No family hx of     Asthma No family hx of     History   Smoking Status    Never   Smokeless Tobacco    Never     Social History    Substance and Sexual Activity      Alcohol use: Yes        Alcohol/week: 28.0 standard drinks of alcohol        Types: 28 Standard drinks or equivalent per week        Comment: 4 drinks a day.       Medications  Allergies     Current Outpatient Medications   Medication Sig Dispense Refill    fluticasone (FLONASE) 50 MCG/ACT nasal spray Spray 1-2 sprays into both nostrils daily 11.1 mL 3    lisinopril (ZESTRIL) 10 MG tablet Take 1 tablet (10 mg) by mouth daily 90 tablet 3    loratadine (CLARITIN) 10 MG tablet Take 10 mg by mouth as needed      metroNIDAZOLE (METROCREAM) 0.75 % external cream Apply topically 2 times daily 45 g 1    minocycline (MINOCIN) 50 MG capsule Take 1 capsule (50 mg) by mouth 2 times daily 60 capsule 2    rivaroxaban ANTICOAGULANT (XARELTO) 20 MG TABS tablet Take 1 tablet (20 mg) by mouth daily (with dinner) 90 tablet 3      Allergies   Allergen Reactions    Wasp Venom Protein Other (See Comments)     Swelling and soreness.     Seasonal Allergies       Medical, surgical, family, social history, and medications were all reviewed and updated as necessary.   Lab Results    Chemistry/lipid CBC Cardiac Enzymes/BNP/TSH/INR   Recent Labs   Lab Test 05/03/21  0923   CHOL 221.0*   HDL 66.0   .0*   TRIG 147.0   CHOLHDLRATIO 3.4     Recent Labs   Lab Test 05/03/21  0923 10/03/19  1356 08/21/17  0841   .0* 129.0* 136.0*     Recent Labs   Lab Test 10/20/23  1321      POTASSIUM 5.1   CHLORIDE 103   CO2 22   GLC 86   BUN 16.5   CR 1.06   GFRESTIMATED 79   TRAVIS 9.6     Recent Labs   Lab  "Test 10/20/23  1321 02/24/23  1033 05/03/21  0923   CR 1.06 1.01 1.0     Recent Labs   Lab Test 10/03/19  1356   A1C 5.0          Recent Labs   Lab Test 10/20/23  1321   WBC 8.7   HGB 15.0   HCT 45.5   MCV 95        Recent Labs   Lab Test 10/20/23  1321 02/24/23  1033 10/16/17  1053   HGB 15.0 14.7 14.7    No results for input(s): \"TROPONINI\" in the last 74558 hours.  No results for input(s): \"BNP\", \"NTBNPI\", \"NTBNP\" in the last 54665 hours.  Recent Labs   Lab Test 02/24/23  1033   TSH 1.34     No results for input(s): \"INR\" in the last 30188 hours.       Total Time- 30 minutes spent on date of encounter doing chart review, history and exam, documentation and further activities as noted above.  This note has been dictated using voice recognition software. Any grammatical, typographical, or context distortions are unintentional and inherent to the software.    Modesta Meek [facility]  [phone]                       "

## 2024-01-26 ENCOUNTER — OFFICE VISIT (OUTPATIENT)
Dept: CARDIOLOGY | Facility: CLINIC | Age: 64
End: 2024-01-26
Payer: COMMERCIAL

## 2024-01-26 VITALS
RESPIRATION RATE: 14 BRPM | OXYGEN SATURATION: 98 % | SYSTOLIC BLOOD PRESSURE: 138 MMHG | BODY MASS INDEX: 28 KG/M2 | WEIGHT: 224 LBS | HEART RATE: 97 BPM | DIASTOLIC BLOOD PRESSURE: 84 MMHG

## 2024-01-26 DIAGNOSIS — Z98.890 S/P ABLATION OF ATRIAL FIBRILLATION: ICD-10-CM

## 2024-01-26 DIAGNOSIS — I48.0 PAROXYSMAL ATRIAL FIBRILLATION (H): Primary | ICD-10-CM

## 2024-01-26 DIAGNOSIS — Z86.79 S/P ABLATION OF ATRIAL FIBRILLATION: ICD-10-CM

## 2024-01-26 DIAGNOSIS — I10 BENIGN ESSENTIAL HYPERTENSION: ICD-10-CM

## 2024-01-26 PROCEDURE — 99214 OFFICE O/P EST MOD 30 MIN: CPT | Performed by: NURSE PRACTITIONER

## 2024-01-26 NOTE — PATIENT INSTRUCTIONS
Joe Copeland,    It was a pleasure to see you today at the [facility].     My recommendations after this visit include:    Continue with current medications  I will see you back in clinic again in 9 months for your 1 year post ablation follow up, call end of July to schedule your appointment end of October.       Modesta Meek CNP  [facility], Electrophysiology  [phone]  EP nurses [phone]

## 2024-01-26 NOTE — LETTER
1/26/2024    Daljit Ryan MD  [address] [address]    RE: Joe Copeland       Dear Colleague,     I had the pleasure of seeing Joe Copeland in the [facility].    Thank you, Dr. Jenkins, for asking the [facility] team to see . Joe Copeland to evaluate 3 months post ablation.    Assessment/Recommendations     Assessment/Plan:    Diagnoses and all orders for this visit:  Paroxysmal atrial fibrillation (H)  S/P ablation of atrial fibrillation  -Dx/date: diagnosed with paroxysmal atrial fibrillation in 2018  -Sx: - he denies chest pain, unexpected dyspnea when not in AF  -10/27/23 Atrial fibrillation ablation with pulmonary vein isolation Dr Jenkins  -He notes no recurrence of atrial fibrillation post ablation, resting heart rate in clinic today is between 85 and 97 bpm.  -He admits today that he did attend a dinner party last night and consumed multiple alcoholic beverages so he is feeling dehydrated today. Typically his resting heart rates are under 85 bpm.    -He monitors HR and rhythm regularly using Apple Watch  -No ER visits since ablation    Benign essential hypertension  -/84, borderline controlled. Continue on current dose of Lisinopril 10 mg daily, continue to monitor BP at home.     He has a [id number] score of 1 for HTN.  HAS-BLED score of 0.  He stopped taking his Xarelto on 1/16/24.  Not taking ASA.       Follow up in 9 months with Modesta Meek CNP one year post ablation. He does not have a general cardiologist and prefers to see EP only.      History of Present Illness/Subjective     Joe Copeland is a very pleasant 63 year old male who comes in today for EP follow up 6 weeks post ablation.       Joe Copeland is a 62 year old male with paroxysmal atrial fibrillation, typical atrial flutter with prior CTI ablation 2018, HTN      Arrhythmia hx  Dx/date: diagnosed with paroxysmal atrial  fibrillation later in 2018, He is very active and exercises routinely. He has an older brother with AF.    Sx: - he denies chest pain, unexpected dyspnea when not in AF  Prior AAD, AV wilber blocking agents: maintained on sotalol 40mg twice daily after he was diagnosed. Due to progressive atrial fibrillation episodes, sotalol was increased to 60mg twice daily in early 2023 - he notes progressive breakthrough episodes (presently 2-3 episodes/week, 1-3hr episodes).  He was seen by Dr. Mj Monge 3/10/2023.  He has been using a Kardia.    Procedures  DCCV: no  Ablation:  atrial flutter and underwent CTI ablation in 2018 by Dr. Mullne.  10/27/23 Atrial fibrillation ablation with pulmonary vein isolation Dr Gregory Lott is doing very well 3 months post ablation.  He stopped taking his Xarelto about 10 days ago.  He is not currently on beta-blocker or antiarrhythmic medications, his sotalol was discontinued 6 weeks post ablation.  He is monitoring his heart rate and rhythm using his Apple Watch regularly.  He has had no recurrence of A-fib post ablation.  He is very active, exercises regularly performing both aerobic and weightlifting.  His aerobic heart rate tends to run between 128 and 129 bpm.  While biking, tends to extend into the 140s and while cross-country skiing the other day, his max heart rate reached 174 bpm.  During recovery after skiing, his heart rate came down to 100 bpm and stayed there for 2 to 3 hours which was concerning to him.  He was asymptomatic denying chest pain, palpitations, shortness of breath, lightheadedness or dizziness or presyncope.  His resting heart rate in clinic today is between 85 and 97 bpm per Apple Watch readings.  He admits that he was at a dinner party last night and consumed multiple alcoholic beverages so he is feeling a bit dehydrated today.    Cardiographics (reviewed):  10/20/23 normal sinus rhythm ventricular rate 74 bpm QRS duration 100 ms QT/QTc 396/439 ms    3/10/2023 - SR 64bpm, QT/QTc 420/433ms   8/6/2018 atrial flutter 131 bpm    7/19/23  ECHO  1. Normal left ventricular size and systolic performance with a visually  estimated ejection fraction of 55-60%.  2. No significant valvular heart disease is identified on this study.  3. Normal right ventricular size and systolic performance.         Problem List:  Patient Active Problem List   Diagnosis    Paroxysmal atrial fibrillation (H)    Benign essential hypertension    Atrial flutter (H)    Hayfever    Acne rosacea    S/P ablation of atrial fibrillation     Revi  e  Physical Examination Review of Systems   w [facility]  There were no vitals taken for this visit.  There is no height or weight on file to calculate BMI.  Wt Readings from Last 3 Encounters:   12/01/23 103.4 kg (228 lb)   10/27/23 102.1 kg (225 lb)   10/20/23 102.1 kg (225 lb)     General Appearance:   Alert, well-appearing and in no acute distress.   HEENT: Atraumatic, normocephalic.  No scleral icterus, normal conjunctivae; mucous membranes pink and moist.     Chest: Chest symmetric, spine straight.   Lungs:   Respirations unlabored: Lungs are clear to auscultation.   Cardiovascular:   Normal first and second heart sounds with no murmurs, rubs, or gallops.  Regular, regular.   Normal JVD, no edema.       Extremities: No cyanosis or clubbing   Musculoskeletal: Moves all extremities   Skin: Warm, dry, intact.    Neurologic: Mood and affect are appropriate, alert and oriented to person, place, time, and situation     ROS: 10 point ROS neg other than the symptoms noted above in the HPI.     Medical History  Surgical History Family History Social History     Past Medical History:   Diagnosis Date    Hypertension     NO ACTIVE PROBLEMS     Past Surgical History:   Procedure Laterality Date    EP ABLATION PULMONARY VEIN ISOLATION N/A 10/27/2023    Procedure: Ablation Atrial Fibrillation;  Surgeon: Paul Jenkins MD;  Location: [facility] CV    NO  HISTORY OF SURGERY      Family History   Problem Relation Age of Onset    Hypertension Mother     Pancreatic Cancer Mother     Thyroid Disease Mother     Leukemia Father     Leukemia Sister 2    Thyroid Disease Sister     Cerebrovascular Disease Brother 67    Skin Cancer Brother     Atrial fibrillation Brother     No Known Problems Brother     Diabetes No family hx of     Coronary Artery Disease No family hx of     Breast Cancer No family hx of     Colon Cancer No family hx of     Prostate Cancer No family hx of     Asthma No family hx of     History   Smoking Status    Never   Smokeless Tobacco    Never     Social History    Substance and Sexual Activity      Alcohol use: Yes        Alcohol/week: 28.0 standard drinks of alcohol        Types: 28 Standard drinks or equivalent per week        Comment: 4 drinks a day.       Medications  Allergies     Current Outpatient Medications   Medication Sig Dispense Refill    fluticasone (FLONASE) 50 MCG/ACT nasal spray Spray 1-2 sprays into both nostrils daily 11.1 mL 3    lisinopril (ZESTRIL) 10 MG tablet Take 1 tablet (10 mg) by mouth daily 90 tablet 3    loratadine (CLARITIN) 10 MG tablet Take 10 mg by mouth as needed      metroNIDAZOLE (METROCREAM) 0.75 % external cream Apply topically 2 times daily 45 g 1    minocycline (MINOCIN) 50 MG capsule Take 1 capsule (50 mg) by mouth 2 times daily 60 capsule 2    rivaroxaban ANTICOAGULANT (XARELTO) 20 MG TABS tablet Take 1 tablet (20 mg) by mouth daily (with dinner) 90 tablet 3      Allergies   Allergen Reactions    Wasp Venom Protein Other (See Comments)     Swelling and soreness.     Seasonal Allergies       Medical, surgical, family, social history, and medications were all reviewed and updated as necessary.   Lab Results    Chemistry/lipid CBC Cardiac Enzymes/BNP/TSH/INR   Recent Labs   Lab Test 05/03/21 0923   CHOL 221.0*   HDL 66.0   .0*   TRIG 147.0   CHOLHDLRATIO 3.4     Recent Labs   Lab Test 05/03/21 0923  "10/03/19  1356 08/21/17  0841   .0* 129.0* 136.0*     Recent Labs   Lab Test 10/20/23  1321      POTASSIUM 5.1   CHLORIDE 103   CO2 22   GLC 86   BUN 16.5   CR 1.06   GFRESTIMATED 79   TRAVIS 9.6     Recent Labs   Lab Test 10/20/23  1321 02/24/23  1033 05/03/21  0923   CR 1.06 1.01 1.0     Recent Labs   Lab Test 10/03/19  1356   A1C 5.0          Recent Labs   Lab Test 10/20/23  1321   WBC 8.7   HGB 15.0   HCT 45.5   MCV 95        Recent Labs   Lab Test 10/20/23  1321 02/24/23  1033 10/16/17  1053   HGB 15.0 14.7 14.7    No results for input(s): \"TROPONINI\" in the last 74602 hours.  No results for input(s): \"BNP\", \"NTBNPI\", \"NTBNP\" in the last 25223 hours.  Recent Labs   Lab Test 02/24/23  1033   TSH 1.34     No results for input(s): \"INR\" in the last 55642 hours.       Total Time- 30 minutes spent on date of encounter doing chart review, history and exam, documentation and further activities as noted above.  This note has been dictated using voice recognition software. Any grammatical, typographical, or context distortions are unintentional and inherent to the software.    Modesta Meek [facility]  [phone]      Thank you for allowing me to participate in the care of your patient.      Sincerely,     Modesta Meek NP     [facility]  cc:   Modesta Meek NP  [address]  [place] [address]      "

## 2024-02-27 ENCOUNTER — TELEPHONE (OUTPATIENT)
Dept: FAMILY MEDICINE | Facility: CLINIC | Age: 64
End: 2024-02-27
Payer: COMMERCIAL

## 2024-02-27 DIAGNOSIS — I10 BENIGN ESSENTIAL HYPERTENSION: Primary | ICD-10-CM

## 2024-02-27 DIAGNOSIS — L71.9 ROSACEA: ICD-10-CM

## 2024-02-27 PROCEDURE — 99207 PR NO CHARGE LOS: CPT | Performed by: FAMILY MEDICINE

## 2024-02-27 RX ORDER — LOSARTAN POTASSIUM 50 MG/1
50 TABLET ORAL DAILY
Qty: 90 TABLET | Refills: 0 | Status: SHIPPED | OUTPATIENT
Start: 2024-02-27 | End: 2024-05-29

## 2024-02-27 NOTE — TELEPHONE ENCOUNTER
Patient called to report an episode of lip swelling.  [facility]23 developed lip swelling.  Lasted about one day. Was traveling with a doctor who recommended he stop his lisinopril as it could be angioedema.  Swelling resolved over about 24 hours.  He has been taking amlodipine as an alternative the past couple days.  Blood pressures 120's.    We discussed the etiology of angioedema and recommendation for emergency evaluation should it reoccur.  We discussed the information as summarized below.    Recommend stopping Lisinopril forever. Even though this episode of lip swelling cannot be attributed 100% to lisinopril, do not restart, ever.    Start Losartan 50mg once daily for blood pressure.  Return to see Dr. Ryan in 3 to 6 weeks for a potassium, kidney function and blood pressure check          What is angioedema?  Angioedema is the medical term for swelling of the tissue under the skin. This can be caused by different things, including an allergic reaction to certain medicines. ACE inhibitors are one type of medicine that can cause this reaction in some people.  The swelling can happen within a few weeks of starting the medicine. But in some cases, it can happen even after months or years of taking an ACE inhibitor every day without any swelling. Doctors are not sure why this happens.  The swelling usually lasts a few days and is not itchy.  What part of the body swells?  The swelling can happen on the lips, face, or tongue. It can also affect other parts of the body, including the intestines.  Depending on where the swelling is, it can cause different problems:  ?Swelling of the tongue, mouth, and lips can be very dangerous if it makes it hard for you to breathe or swallow. You should go to the emergency department right away if you get swelling in or near your mouth or throat. If you are having trouble breathing, you might need to call for an ambulance (in the [place] and [place],  "call 9-1-1).  In the hospital, the doctors will monitor your breathing. If the swelling is causing your throat to close, they can put a tube in your throat to help you breathe. You will stay in the hospital until the swelling goes down.  ?If swelling happens in the intestines, it can cause pain, vomiting, or diarrhea      What should I do if I take an ACE inhibitor and have swelling?  You should:  ?Stop taking the medicine, and tell your doctor  ?Never take that medicine or any other ACE inhibitor in the future  ?Make sure your main doctor, other doctors, and nurses who take care of you and your pharmacy know that you should never take ACE inhibitors again      Can the swelling happen again after I have stopped the ACE inhibitor?  Yes. You might get swelling again 1 or more times within the next few months. The medicine affects your body for a while, even after you stop taking it.  If you get swelling around your mouth or tongue again, go to the emergency department so doctors can monitor your breathing.  The swelling should stop happening after a few months. If it does not, talk to your doctor. You might need to see an allergy specialist, because there might be another reason that you are still swelling. Sometimes people have swelling caused by another problem that the ACE inhibitor makes worse.      What medicine should I switch to?  There are many other medicines for high blood pressure and heart problems that do not cause swelling. Ask your doctor what you can take instead of an ACE inhibitor. Medicines that do not cause swelling include angiotensin II receptor blockers (\"ARBs\"), beta blockers, and calcium channel blockers.  Remember that if you get swelling again in the first few months after stopping an ACE inhibitor, it is probably still related to the ACE inhibitor. That's because ACE inhibitors continue to affect your body for a while, even after you stop taking them.  "

## 2024-02-28 RX ORDER — MINOCYCLINE HYDROCHLORIDE 50 MG/1
CAPSULE ORAL
Qty: 60 CAPSULE | Refills: 1 | Status: SHIPPED | OUTPATIENT
Start: 2024-02-28 | End: 2024-09-03

## 2024-02-28 NOTE — TELEPHONE ENCOUNTER
Message to physician:     Date of last visit: 3/6/2023    Date of next visit if scheduled:     Potassium   Date Value Ref Range Status   10/20/2023 5.1 3.4 - 5.3 mmol/L Final   05/03/2021 4.5 3.4 - 5.3 mmol/L Final     Creatinine   Date Value Ref Range Status   10/20/2023 1.06 0.67 - 1.17 mg/dL Final   05/03/2021 1.0 0.8 - 1.5 mg/dL Final     GFR Estimate   Date Value Ref Range Status   10/20/2023 79 >60 mL/min/1.73m2 Final       BP Readings from Last 3 Encounters:   01/26/24 138/84   12/01/23 114/70   10/27/23 125/75       Hemoglobin A1C   Date Value Ref Range Status   10/03/2019 5.0 4.1 - 5.7 % Final       Please complete refill and CLOSE ENCOUNTER.  Closing the encounter signifies the refill is complete.

## 2024-05-28 DIAGNOSIS — I10 BENIGN ESSENTIAL HYPERTENSION: ICD-10-CM

## 2024-05-29 RX ORDER — LOSARTAN POTASSIUM 50 MG/1
TABLET ORAL
Qty: 90 TABLET | Refills: 0 | Status: SHIPPED | OUTPATIENT
Start: 2024-05-29 | End: 2024-07-19

## 2024-05-29 NOTE — TELEPHONE ENCOUNTER
Message to physician: interface refill request    Date of last visit: 3/6/2023    Date of next visit if scheduled: none    Potassium   Date Value Ref Range Status   10/20/2023 5.1 3.4 - 5.3 mmol/L Final   05/03/2021 4.5 3.4 - 5.3 mmol/L Final     Creatinine   Date Value Ref Range Status   10/20/2023 1.06 0.67 - 1.17 mg/dL Final   05/03/2021 1.0 0.8 - 1.5 mg/dL Final     GFR Estimate   Date Value Ref Range Status   10/20/2023 79 >60 mL/min/1.73m2 Final       BP Readings from Last 3 Encounters:   01/26/24 138/84   12/01/23 114/70   10/27/23 125/75       Hemoglobin A1C   Date Value Ref Range Status   10/03/2019 5.0 4.1 - 5.7 % Final       Please complete refill and CLOSE ENCOUNTER.  Closing the encounter signifies the refill is complete.

## 2024-06-09 ENCOUNTER — HEALTH MAINTENANCE LETTER (OUTPATIENT)
Age: 64
End: 2024-06-09

## 2024-06-26 NOTE — TELEPHONE ENCOUNTER
2nd Attempt to contact pt, voicemail message was left with contact information and instructing pt to call back.  5/2/2023 9:23 AM  Celia Kaiser RN  
3rd Attempt to contact pt, after multiple attempts to contact pt without success or return call back, Will await further contact form pt.  5/4/2023 10:43 AM  Celia Kaiser RN      
Mj Monge MD  P [facility] team,   Please see my note from today.   Patient is traveling until the end of the first week of April.  Can 1 of you please reach out to him when he returns to determine if he wishes to move forward with mapping/ablation of his atrial fibrillation.   Mapping system: Particle Code/[employer]ott   Okay for double up.   Please schedule patient for echo when he returns.   As per my note if the patient declines moving ahead with ablation, then schedule him for a stress echo and follow-up as outlined.   Thanks   Mj     
Noted, will postpone note and call pt after he returns from his trip to discuss.    Opal  
Phone call to patient, no answer and message left for return call.  
Phone call to pt to discuss, he states he has another trip to [facility], and he would like a call after that trip.      Discussed below recommendations, he states he does want to do the ablation, but isn't in a hurry because he is currently tolerating his sotolol increase with no AFIB.  Discussed we will call him after his [facility] trip and plan to prep at that time. If pt decides he does not want ablation at that time, he is aware of stress echo order as stated below.    Will post pone note for after his trip to call again.    Opal Thomas RN  4/10/2023 8:23 AM    
Detail Level: Zone

## 2024-07-19 DIAGNOSIS — I10 BENIGN ESSENTIAL HYPERTENSION: ICD-10-CM

## 2024-07-19 RX ORDER — LOSARTAN POTASSIUM 50 MG/1
50 TABLET ORAL DAILY
Qty: 30 TABLET | Refills: 0 | Status: SHIPPED | OUTPATIENT
Start: 2024-07-19 | End: 2024-08-28

## 2024-08-26 ENCOUNTER — OFFICE VISIT (OUTPATIENT)
Dept: FAMILY MEDICINE | Facility: CLINIC | Age: 64
End: 2024-08-26
Payer: COMMERCIAL

## 2024-08-26 VITALS
BODY MASS INDEX: 27.03 KG/M2 | RESPIRATION RATE: 16 BRPM | OXYGEN SATURATION: 99 % | DIASTOLIC BLOOD PRESSURE: 89 MMHG | HEART RATE: 75 BPM | WEIGHT: 222 LBS | HEIGHT: 76 IN | SYSTOLIC BLOOD PRESSURE: 129 MMHG | TEMPERATURE: 98.1 F

## 2024-08-26 DIAGNOSIS — L71.9 ACNE ROSACEA: ICD-10-CM

## 2024-08-26 DIAGNOSIS — Z98.890 S/P ABLATION OF ATRIAL FIBRILLATION: ICD-10-CM

## 2024-08-26 DIAGNOSIS — Z13.220 SCREENING FOR LIPID DISORDERS: ICD-10-CM

## 2024-08-26 DIAGNOSIS — I10 BENIGN ESSENTIAL HYPERTENSION: Primary | ICD-10-CM

## 2024-08-26 DIAGNOSIS — Z86.79 S/P ABLATION OF ATRIAL FIBRILLATION: ICD-10-CM

## 2024-08-26 PROBLEM — F10.20 ALCOHOL DEPENDENCE (H): Status: ACTIVE | Noted: 2024-08-26

## 2024-08-26 LAB
ANION GAP SERPL CALCULATED.3IONS-SCNC: 14 MMOL/L (ref 7–15)
BUN SERPL-MCNC: 13.8 MG/DL (ref 8–23)
CALCIUM SERPL-MCNC: 9.4 MG/DL (ref 8.8–10.4)
CHLORIDE SERPL-SCNC: 104 MMOL/L (ref 98–107)
CHOLEST SERPL-MCNC: 206 MG/DL
CREAT SERPL-MCNC: 0.99 MG/DL (ref 0.67–1.17)
EGFRCR SERPLBLD CKD-EPI 2021: 86 ML/MIN/1.73M2
FASTING STATUS PATIENT QL REPORTED: YES
FASTING STATUS PATIENT QL REPORTED: YES
GLUCOSE SERPL-MCNC: 97 MG/DL (ref 70–99)
HCO3 SERPL-SCNC: 23 MMOL/L (ref 22–29)
HDLC SERPL-MCNC: 70 MG/DL
LDLC SERPL CALC-MCNC: 109 MG/DL
NONHDLC SERPL-MCNC: 136 MG/DL
POTASSIUM SERPL-SCNC: 4.1 MMOL/L (ref 3.4–5.3)
SODIUM SERPL-SCNC: 141 MMOL/L (ref 135–145)
TRIGL SERPL-MCNC: 134 MG/DL

## 2024-08-26 PROCEDURE — 36415 COLL VENOUS BLD VENIPUNCTURE: CPT | Performed by: FAMILY MEDICINE

## 2024-08-26 PROCEDURE — 99214 OFFICE O/P EST MOD 30 MIN: CPT | Performed by: FAMILY MEDICINE

## 2024-08-26 PROCEDURE — 80048 BASIC METABOLIC PNL TOTAL CA: CPT | Performed by: FAMILY MEDICINE

## 2024-08-26 PROCEDURE — 80061 LIPID PANEL: CPT | Performed by: FAMILY MEDICINE

## 2024-08-26 NOTE — PROGRESS NOTES
HPI:   Joe Copeland is a 64 year old  male who presents for:    Chief Complaint   Patient presents with    RECHECK     Medication progress follow-up       Hypertension: Is on losartan 50 mg once daily.  No dizziness lightheadedness or orthostatic episodes.  Exercises regularly including biking.  Good exercise tolerance.  No chest pain episodes.  Last basic metabolic profile was over 1 year ago.    Rosacea: Uses MetroCream and minocycline.  Uses more on a as needed basis.  Nearly due for refills.    Status post atrial fibrillation/flutter ablation: Had an ablation in 2018 and again in October 2023.  Has not had recurrence since his most recent ablation.  Good exercise tolerance.  Feeling well and staying physically active.  Followed up with electrophysiology cardiologist office in January 2024.    Social history: , 2 children.  1 grandchild.  Works as a  of [employer].  Non-smoker.         PMHX:     Patient Active Problem List   Diagnosis    Paroxysmal atrial fibrillation (H)    Benign essential hypertension    Atrial flutter (H)    Hayfever    Acne rosacea    S/P ablation of atrial fibrillation    Alcohol dependence (H)       Current Outpatient Medications   Medication Sig Dispense Refill    fluticasone (FLONASE) 50 MCG/ACT nasal spray Spray 1-2 sprays into both nostrils daily 11.1 mL 3    losartan (COZAAR) 50 MG tablet TAKE ONE (1) TABLET (50 MG) BY MOUTH DAILY 90 tablet 0    metroNIDAZOLE (METROCREAM) 0.75 % external cream Apply topically 2 times daily 45 g 1    minocycline (MINOCIN) 50 MG capsule TAKE ONE (1) CAPSULE (50 MG) BY MOUTH TWO (2) TIMES DAILY 60 capsule 1       Social History     Tobacco Use    Smoking status: Never     Passive exposure: Never    Smokeless tobacco: Never   Vaping Use    Vaping status: Never Used   Substance Use Topics    Alcohol use: Yes     Alcohol/week: 28.0 standard drinks of alcohol     Types: 28 Standard drinks or equivalent per week      "Comment: 4 drinks a day.    Drug use: Never       Social History     Social History Narrative    Not on file       Allergies   Allergen Reactions    Lisinopril Angioedema     Lip swelling on 2/23/24 while on lisinopril 10mg daily    Wasp Venom Protein Other (See Comments)     Swelling and soreness.     Seasonal Allergies        No results found for this or any previous visit (from the past 24 hour(s)).         Review of Systems:     General: No recent illness.  Weight stable.  ENT: Occasional rhinitis.  No recent upper respiratory infections  CV: As outlined in HPI  Respiratory: No cough or wheeze           Physical Exam:     Vitals:    08/26/24 0805 08/26/24 0808   BP: (!) 141/94 129/89   BP Location: Right arm Right arm   Patient Position: Sitting Sitting   Pulse: 75    Resp: 16    Temp: 98.1  F (36.7  C)    TempSrc: Oral    SpO2: 99%    Weight: 100.7 kg (222 lb)    Height: 1.92 m (6' 3.59\")      Body mass index is 27.32 kg/m .    General: Alert,   in no acute distress  HEENT: Head is free of trauma.   Sclerae non-icteric. PERRL, Moist oral mucus membranes, no tonsilar hypertrophy or exudate.   Resp: Clear to auscultation bilaterally  CV: Regular rate and rhythm  Abd: Soft, non-tender.  Ext: Warm.  No significant edema  Skin: Mild central facial erythema.  Psych: Mood appropriate     Results for orders placed or performed in visit on 08/26/24   Basic metabolic panel     Status: None   Result Value Ref Range    Sodium 141 135 - 145 mmol/L    Potassium 4.1 3.4 - 5.3 mmol/L    Chloride 104 98 - 107 mmol/L    Carbon Dioxide (CO2) 23 22 - 29 mmol/L    Anion Gap 14 7 - 15 mmol/L    Urea Nitrogen 13.8 8.0 - 23.0 mg/dL    Creatinine 0.99 0.67 - 1.17 mg/dL    GFR Estimate 86 >60 mL/min/1.73m2    Calcium 9.4 8.8 - 10.4 mg/dL    Glucose 97 70 - 99 mg/dL    Patient Fasting > 8hrs? Yes    Lipid Profile     Status: Abnormal   Result Value Ref Range    Cholesterol 206 (H) <200 mg/dL    Triglycerides 134 <150 mg/dL    Direct " Measure HDL 70 >=40 mg/dL    LDL Cholesterol Calculated 109 (H) <=100 mg/dL    Non HDL Cholesterol 136 (H) <130 mg/dL    Patient Fasting > 8hrs? Yes     Narrative    Cholesterol  Desirable:  <200 mg/dL    Triglycerides  Normal:  Less than 150 mg/dL  Borderline High:  150-199 mg/dL  High:  200-499 mg/dL  Very High:  Greater than or equal to 500 mg/dL    Direct Measure HDL  Female:  Greater than or equal to 50 mg/dL   Male:  Greater than or equal to 40 mg/dL    LDL Cholesterol  Desirable:  <100mg/dL  Above Desirable:  100-129 mg/dL   Borderline High:  130-159 mg/dL   High:  160-189 mg/dL   Very High:  >= 190 mg/dL    Non HDL Cholesterol  Desirable:  130 mg/dL  Above Desirable:  130-159 mg/dL  Borderline High:  160-189 mg/dL  High:  190-219 mg/dL  Very High:  Greater than or equal to 220 mg/dL         Assessment and Plan   1. Benign essential hypertension  Well-controlled  BMP today shows normal renal function and electrolytes  Continue losartan 50 mg once daily  Repeat BMP in 1 year or earlier with medication changes    - Basic metabolic panel; Future  - Lipid Profile; Future  - Basic metabolic panel  - Lipid Profile    2.  Screening for hyperlipidemia  Lipid profile shows HDL at goal, LDL near goal  Continue making healthy diet choices and regular exercise  Option for statin deferred      3.  History of atrial fibrillation/flutter ablation  Well-controlled and asymptomatic  Continue to monitor for breakthrough A-fib/flutter with watch/monitoring device  Continue to follow with the EP annually, and return earlier with any symptoms or episodes noted by home monitoring      4.  Rosacea  Continue minocycline/MetroCream regimen  Refills when needed  Return for reevaluation as needed      Follow up: No later than 1 year  Options for treatment and follow-up care were reviewed with the patient and/or guardian. Joe Copeland and/or guardian engaged in the decision making process and verbalized understanding of the  options discussed and agreed with the final plan.    Daljit Ryan MD  Faculty - [facility]

## 2024-08-28 DIAGNOSIS — I10 BENIGN ESSENTIAL HYPERTENSION: ICD-10-CM

## 2024-08-28 RX ORDER — LOSARTAN POTASSIUM 50 MG/1
TABLET ORAL
Qty: 90 TABLET | Refills: 0 | Status: SHIPPED | OUTPATIENT
Start: 2024-08-28

## 2024-08-29 NOTE — RESULT ENCOUNTER NOTE
"Your cholesterol is at goal level.   Your HDL (\"good cholesterol\") is great, above 50 is the goal, and yours is at 70.  Continue to make healthy diet choices and continue to exercise regularly.    Your kidney function and electrolytes are normal.  We will recheck these in one year.    "

## 2024-09-03 DIAGNOSIS — L71.9 ROSACEA: ICD-10-CM

## 2024-09-03 RX ORDER — MINOCYCLINE HYDROCHLORIDE 50 MG/1
CAPSULE ORAL
Qty: 60 CAPSULE | Refills: 1 | Status: SHIPPED | OUTPATIENT
Start: 2024-09-03

## 2024-11-26 DIAGNOSIS — I10 BENIGN ESSENTIAL HYPERTENSION: ICD-10-CM

## 2024-11-26 RX ORDER — LOSARTAN POTASSIUM 50 MG/1
TABLET ORAL
Qty: 90 TABLET | Refills: 0 | Status: SHIPPED | OUTPATIENT
Start: 2024-11-26

## 2025-02-27 ENCOUNTER — MYC REFILL (OUTPATIENT)
Dept: FAMILY MEDICINE | Facility: CLINIC | Age: 65
End: 2025-02-27
Payer: COMMERCIAL

## 2025-02-27 DIAGNOSIS — I10 BENIGN ESSENTIAL HYPERTENSION: ICD-10-CM

## 2025-02-27 RX ORDER — LOSARTAN POTASSIUM 50 MG/1
50 TABLET ORAL DAILY
Qty: 90 TABLET | Refills: 0 | Status: SHIPPED | OUTPATIENT
Start: 2025-02-27

## 2025-05-02 ENCOUNTER — TRANSFERRED RECORDS (OUTPATIENT)
Dept: HEALTH INFORMATION MANAGEMENT | Facility: CLINIC | Age: 65
End: 2025-05-02
Payer: COMMERCIAL

## 2025-05-12 ENCOUNTER — RESULTS FOLLOW-UP (OUTPATIENT)
Dept: FAMILY MEDICINE | Facility: CLINIC | Age: 65
End: 2025-05-12

## 2025-05-12 ENCOUNTER — OFFICE VISIT (OUTPATIENT)
Dept: FAMILY MEDICINE | Facility: CLINIC | Age: 65
End: 2025-05-12
Payer: COMMERCIAL

## 2025-05-12 VITALS
DIASTOLIC BLOOD PRESSURE: 89 MMHG | RESPIRATION RATE: 20 BRPM | SYSTOLIC BLOOD PRESSURE: 132 MMHG | HEIGHT: 75 IN | OXYGEN SATURATION: 98 % | HEART RATE: 78 BPM | BODY MASS INDEX: 27.98 KG/M2 | TEMPERATURE: 98 F | WEIGHT: 225 LBS

## 2025-05-12 DIAGNOSIS — Z11.4 SCREENING FOR HIV (HUMAN IMMUNODEFICIENCY VIRUS): ICD-10-CM

## 2025-05-12 DIAGNOSIS — L57.0 ACTINIC KERATOSIS: ICD-10-CM

## 2025-05-12 DIAGNOSIS — I10 BENIGN ESSENTIAL HYPERTENSION: ICD-10-CM

## 2025-05-12 DIAGNOSIS — Z86.79 HISTORY OF ATRIAL FIBRILLATION: ICD-10-CM

## 2025-05-12 DIAGNOSIS — L71.9 ROSACEA: ICD-10-CM

## 2025-05-12 DIAGNOSIS — Z11.59 NEED FOR HEPATITIS C SCREENING TEST: ICD-10-CM

## 2025-05-12 DIAGNOSIS — Z01.818 PREOP GENERAL PHYSICAL EXAM: Primary | ICD-10-CM

## 2025-05-12 DIAGNOSIS — F10.29 ALCOHOL DEPENDENCE WITH UNSPECIFIED ALCOHOL-INDUCED DISORDER (H): ICD-10-CM

## 2025-05-12 LAB
ERYTHROCYTE [DISTWIDTH] IN BLOOD BY AUTOMATED COUNT: 12.5 % (ref 10–15)
HCT VFR BLD AUTO: 45.9 % (ref 40–53)
HCV AB SERPL QL IA: NONREACTIVE
HGB BLD-MCNC: 14.9 G/DL (ref 13.3–17.7)
HIV 1+2 AB+HIV1 P24 AG SERPL QL IA: NONREACTIVE
MCH RBC QN AUTO: 30.4 PG (ref 26.5–33)
MCHC RBC AUTO-ENTMCNC: 32.5 G/DL (ref 31.5–36.5)
MCV RBC AUTO: 94 FL (ref 78–100)
PLATELET # BLD AUTO: 253 10E3/UL (ref 150–450)
RBC # BLD AUTO: 4.9 10E6/UL (ref 4.4–5.9)
WBC # BLD AUTO: 6.9 10E3/UL (ref 4–11)

## 2025-05-12 PROCEDURE — 90677 PCV20 VACCINE IM: CPT | Performed by: FAMILY MEDICINE

## 2025-05-12 PROCEDURE — 90471 IMMUNIZATION ADMIN: CPT | Performed by: FAMILY MEDICINE

## 2025-05-12 PROCEDURE — 17000 DESTRUCT PREMALG LESION: CPT | Performed by: FAMILY MEDICINE

## 2025-05-12 PROCEDURE — 3075F SYST BP GE 130 - 139MM HG: CPT | Performed by: FAMILY MEDICINE

## 2025-05-12 PROCEDURE — 80048 BASIC METABOLIC PNL TOTAL CA: CPT | Performed by: FAMILY MEDICINE

## 2025-05-12 PROCEDURE — 85027 COMPLETE CBC AUTOMATED: CPT | Performed by: FAMILY MEDICINE

## 2025-05-12 PROCEDURE — 3079F DIAST BP 80-89 MM HG: CPT | Performed by: FAMILY MEDICINE

## 2025-05-12 PROCEDURE — 36415 COLL VENOUS BLD VENIPUNCTURE: CPT | Performed by: FAMILY MEDICINE

## 2025-05-12 PROCEDURE — 86803 HEPATITIS C AB TEST: CPT | Performed by: FAMILY MEDICINE

## 2025-05-12 PROCEDURE — 93000 ELECTROCARDIOGRAM COMPLETE: CPT | Mod: 59 | Performed by: FAMILY MEDICINE

## 2025-05-12 PROCEDURE — 87389 HIV-1 AG W/HIV-1&-2 AB AG IA: CPT | Performed by: FAMILY MEDICINE

## 2025-05-12 PROCEDURE — 99214 OFFICE O/P EST MOD 30 MIN: CPT | Mod: 25 | Performed by: FAMILY MEDICINE

## 2025-05-12 NOTE — PROGRESS NOTES
Prior to immunization administration, verified patients identity using patient s name and date of birth. Please see Immunization Activity for additional information.     PCV-20 given    Screening Questionnaire for Adult Immunization    Are you sick today?   No   Do you have allergies to medications, food, a vaccine component or latex?   No   Have you ever had a serious reaction after receiving a vaccination?   No   Do you have a long-term health problem with heart, lung, kidney, or metabolic disease (e.g., diabetes), asthma, a blood disorder, no spleen, complement component deficiency, a cochlear implant, or a spinal fluid leak?  Are you on long-term aspirin therapy?   No   Do you have cancer, leukemia, HIV/AIDS, or any other immune system problem?   No   Do you have a parent, brother, or sister with an immune system problem?   No   In the past 3 months, have you taken medications that affect  your immune system, such as prednisone, other steroids, or anticancer drugs; drugs for the treatment of rheumatoid arthritis, Crohn s disease, or psoriasis; or have you had radiation treatments?   No   Have you had a seizure, or a brain or other nervous system problem?   No   During the past year, have you received a transfusion of blood or blood    products, or been given immune (gamma) globulin or antiviral drug?   No   For women: Are you pregnant or is there a chance you could become       pregnant during the next month?   No   Have you received any vaccinations in the past 4 weeks?   No     Immunization questionnaire answers were all negative.      Patient instructed to remain in clinic for 15 minutes afterwards, and to report any adverse reactions.     Screening performed by Iwona Jerome MA on 5/12/2025 at 10:07 AM.

## 2025-05-12 NOTE — PATIENT INSTRUCTIONS
How to Take Your Medication Before Surgery  Preoperative Medication Instructions   Hold your losartan the day prior to surgery, and the day of surgery  You can resume losartan the day following surgery    Do not take any NSAID type over-the-counter pain medications from now until the time of your surgery.       Patient Education   Preparing for Your Surgery  For Adults  Getting started  In most cases, a nurse will call to review your health history and instructions. They will give you an arrival time based on your scheduled surgery time. Please be ready to share:  Your doctor's clinic name and phone number  Your medical, surgical, and anesthesia history  A list of allergies and sensitivities  A list of medicines, including herbal treatments and over-the-counter drugs  Whether the patient has a legal guardian (ask how to send us the papers in advance)  Note: You may not receive a call if you were seen at our PAC (Preoperative Assessment Center).  Please tell us if you're pregnant--or if there's any chance you might be pregnant. Some surgeries may injure a fetus (unborn baby), so they require a pregnancy test. Surgeries that are safe for a fetus don't always need a test, and you can choose whether to have one.   Preparing for surgery  Within 10 to 30 days of surgery: Have a pre-op exam (sometimes called an H&P, or History and Physical). This can be done at a clinic or pre-operative center.  If you're having a , you may not need this exam. Talk to your care team.  At your pre-op exam, talk to your care team about all medicines you take. (This includes CBD oil and any drugs, such as THC, marijuana, and other forms of cannabis.) If you need to stop any medicine before surgery, ask when to start taking it again.  This is for your safety. Many medicines and drugs can make you bleed too much during surgery. Some change how well surgery (anesthesia) drugs work.  Call your insurance company to let them know you're  having surgery. (If you don't have insurance, call [phone].)  Call your clinic if there's any change in your health. This includes a scrape or scratch near the surgery site, or any signs of a cold (sore throat, runny nose, cough, rash, fever).  Eating and drinking guidelines  For your safety: Unless your surgeon tells you otherwise, follow the guidelines below.  Eat and drink as normal until 8 hours before you arrive for surgery. After that, no food or milk. You can spit out gum when you arrive.  Drink clear liquids until 2 hours before you arrive. These are liquids you can see through, like water, Gatorade, and Propel Water. They also include plain black coffee and tea (no cream or milk).  No alcohol for 24 hours before you arrive. The night before surgery, stop any drinks that contain THC.  If your care team tells you to take medicine on the morning of surgery, it's okay to take it with a sip of water. No other medicines or drugs are allowed (including CBD oil)--follow your care team's instructions.  If you have questions the day of surgery, call your hospital or surgery center.   Preventing infection  Shower or bathe the night before and the morning of surgery. Follow the instructions your clinic gave you. (If no instructions, use regular soap.)  Don't shave or clip hair near your surgery site. We'll remove the hair if needed.  Don't smoke or vape the morning of surgery. No chewing tobacco for 6 hours before you arrive. A nicotine patch is okay. You may spit out nicotine gum when you arrive.  For some surgeries, the surgeon will tell you to fully quit smoking and nicotine.  We will make every effort to keep you safe from infection. We will:  Clean our hands often with soap and water (or an alcohol-based hand rub).  Clean the skin at your surgery site with a special soap that kills germs.  Give you a special gown to keep you warm. (Cold raises the risk of infection.)  Wear hair covers, masks, gowns, and  gloves during surgery.  Give antibiotic medicine, if prescribed. Not all surgeries need this medicine.  What to bring on the day of surgery  Photo ID and insurance card  Copy of your health care directive, if you have one  Glasses and hearing aids (bring cases)  You can't wear contacts during surgery  Inhaler and eye drops, if you use them (tell us about these when you arrive)  CPAP machine or breathing device, if you use them  A few personal items, if spending the night  If you have . . .  A pacemaker, ICD (cardiac defibrillator), or other implant: Bring the ID card.  An implanted stimulator: Bring the remote control.  A legal guardian: Bring a copy of the certified (court-stamped) guardianship papers.  Please remove any jewelry, including body piercings. Leave jewelry and other valuables at home.  If you're going home the day of surgery  You must have a responsible adult drive you home. They should stay with you overnight as well.  If you don't have someone to stay with you, and you aren't safe to go home alone, we may keep you overnight. Insurance often won't pay for this.  After surgery  If it's hard to control your pain or you need more pain medicine, please call your surgeon's office.  Questions?   If you have any questions for your care team, list them here:   ____________________________________________________________________________________________________________________________________________________________________________________________________________________________________________________________  For informational purposes only. Not to replace the advice of your health care provider. Copyright   2003, 2019 [facility]. All rights reserved. Clinically reviewed by Blanco Wick MD. [employer] [id number] - REV 08/24.

## 2025-05-12 NOTE — PROGRESS NOTES
Preoperative Evaluation  [facility] [address] [place] [address]  Phone: [phone]  Fax: [phone]  Primary Provider: Daljit Ryan MD  Pre-op Performing Provider: Daljit Ryan MD  May 12, 2025           5/5/2025   Surgical Information   What procedure is being done? Back Surgery   Facility or Hospital where procedure/surgery will be performed: [facility]   Who is doing the procedure / surgery? Dr. Neville Alonso   Date of surgery / procedure: May 21   Time of surgery / procedure: 8:00   Where do you plan to recover after surgery? at home with family     Fax number for surgical facility: Fax to [facility]    Assessment & Plan     The proposed surgical procedure is considered INTERMEDIATE risk.    Preop general physical exam  Fit for the proposed procedure without further evaluation    Will obtain a base metabolic profile due to history of hypertension and losartan  Recommend holding losartan the day prior to the procedure and the day of the procedure, resume day following procedure    EKG due to to a past history of atrial fibrillation status post ablation 2023 without recurrence.  Good cardiovascular function, no further cardiovascular function required preoperatively.    - Basic metabolic panel; Future  - CBC with platelets; Future  - EKG 12-lead complete w/read - Clinics  - Basic metabolic panel  - CBC with platelets    Benign essential hypertension  Well-controlled  Continue losartan 50 mg a day  Hold losartan the day prior to the procedure and the day of the procedure, resume the day following procedure    - Basic metabolic panel; Future  - CBC with platelets; Future  - Basic metabolic panel  - CBC with platelets    History of atrial fibrillation  No recurrence since ablation in 2023    - EKG 12-lead complete w/read - Clinics    Alcohol dependence with unspecified alcohol-induced disorder (H)  Recommend abstaining from alcohol for the 2  days prior to the procedure, and reduced alcohol intake long-term    Screening for HIV (human immunodeficiency virus)  Routine screening per protocol.  No risk factors.    - HIV Antigen Antibody Combo Cascade; Future  - HIV Antigen Antibody Combo Cascade    Need for hepatitis C screening test  Routine screening per protocol.  No risk factors.  - Hepatitis C Screen Reflex to HCV RNA Quant and Genotype; Future  - DESTRUCT PREMALIGNANT LESION, FIRST  - Hepatitis C Screen Reflex to HCV RNA Quant and Genotype    Rosacea  Continue MetroGel  Continue to follow with dermatology, has next appointment in June    Actinic keratosis  3 mm, erythematous flaky skin lesion at the left forehead consistent with actinic keratosis  Risks of hypopigmentation, discomfort, incomplete diagnosis, incomplete resolution discussed with the patient, consent obtained.  Lesion was treated with cryotherapy x 3 today  This will be reevaluated at his dermatology appointment coming up next month.        - No identified additional risk factors other than previously addressed    Antiplatelet or Anticoagulation Medication Instructions  Discontinue all NSAIDs today, which is 9 days prior to surgery    Additional Medication Instructions  Discontinue losartan the day prior to surgery, and the day of surgery  May resume day following surgery    Recommendation  Approval given to proceed with proposed procedure, without further diagnostic evaluation.    Follow-up 2 months      Subjective   Oren is a 64 year old, presenting for the following:  Pre-Op Exam (May 21st , [facility]: [phone])          5/12/2025     8:58 AM   Additional Questions   Roomed by humza hart   Accompanied by self         5/12/2025    Information    services provided? No     Via the Health Maintenance questionnaire, the patient has reported the following services have been completed -Colonscopy: [facility] 2024-01-06, this information has been  sent to the abstraction team.  HPI:     Progressive low back pain over a number of years.  Intermittent pain radiating down both legs right worse than left.  Over the past few months has noticed right foot drop.  None Saint Patrick[place] Day was tripping over his weight right foot frequently.  Has been evaluated at [facility], as well as [facility].  [facility] has recommended procedure for spinal stenosis.  He has been evaluated with MRI.  Plans to follow through with the surgical procedure for spinal stenosis as outlined.  Surgery date as outlined above.  No bowel or bladder dysfunction.  Get a call next week         Hypertension: Is on losartan 50 mg once daily.  No dizziness lightheadedness or orthostatic episodes.  Exercises regularly including biking on his [place]. Not able to walk/hike as much due to spine symptoms and right foot drop.  No dyspnea or palpitations with vigorous biking on [place].  No chest pain episodes.    ]  Rosacea: Uses MetroCream and minocycline.  Uses more on a as needed basis.  No change recently.  Uses hat and sunscreen.    Status post atrial fibrillation/flutter ablation: Had an ablation in 2018 and again in October 2023.  Has not had recurrence since his 2023 ablation.  Strong exercise tolerance.  Feeling well and staying physically active.  Has followed up with electrophysiology cardiologist office in 2024.     Social history: , 2 children.  Grandchildren. Loves biking, boating, outdoors.  Works as a  of [employer].  Non-smoker.  3-4 alcoholic drinks per day. No smoking.          5/5/2025   Pre-Op Questionnaire   Have you ever had a heart attack or stroke? No   Have you ever had surgery on your heart or blood vessels, such as a stent placement, a coronary artery bypass, or surgery on an artery in your head, neck, heart, or legs? (!) YES atrial fibrillation/flutter ablation in 2023.  No recurrence since then.   Do you have chest pain with  activity? No   Do you have a history of heart failure? No   Do you currently have a cold, bronchitis or symptoms of other infection? No   Do you have a cough, shortness of breath, or wheezing? No   Do you or anyone in your family have previous history of blood clots? No   Do you or does anyone in your family have a serious bleeding problem such as prolonged bleeding following surgeries or cuts? No   Have you ever had problems with anemia or been told to take iron pills? No   Have you had any abnormal blood loss such as black, tarry or bloody stools? No   Have you ever had a blood transfusion? No   Are you willing to have a blood transfusion if it is medically needed before, during, or after your surgery? Yes   Have you or any of your relatives ever had problems with anesthesia? No   Do you have sleep apnea, excessive snoring or daytime drowsiness? No   Do you have any artifical heart valves or other implanted medical devices like a pacemaker, defibrillator, or continuous glucose monitor? No   Do you have artificial joints? No   Are you allergic to latex? No     Advance Care Planning    Discussed advance care planning with patient; informed AVS has link to [facility].    Preoperative Review of    reviewed - no record of controlled substances prescribed.  6}      Patient Active Problem List    Diagnosis Date Noted    Alcohol dependence (H) 08/26/2024     Priority: Medium    S/P ablation of atrial fibrillation 11/30/2023     Priority: Medium    Benign essential hypertension 01/06/2020     Priority: Medium    Paroxysmal atrial fibrillation (H) 10/03/2019     Priority: Medium    Atrial flutter (H) 08/06/2018     Priority: Medium     Typical flutter ablation ([facility]) 8/6/2018      Hayfever 04/09/2010     Priority: Medium    Acne rosacea 04/09/2010     Priority: Medium      Past Medical History:   Diagnosis Date    Hypertension     NO ACTIVE PROBLEMS      Past Surgical History:   Procedure Laterality Date    EP  "ABLATION PULMONARY VEIN ISOLATION N/A 10/27/2023    Procedure: Ablation Atrial Fibrillation;  Surgeon: Paul Jenkins MD;  Location: [facility]    NO HISTORY OF SURGERY       Current Outpatient Medications   Medication Sig Dispense Refill    fluticasone (FLONASE) 50 MCG/ACT nasal spray Spray 1-2 sprays into both nostrils daily 11.1 mL 3    losartan (COZAAR) 50 MG tablet TAKE ONE (1) TABLET (50 MG) BY MOUTH DAILY. 90 tablet 0    metroNIDAZOLE (METROCREAM) 0.75 % external cream Apply topically 2 times daily 45 g 1    minocycline (MINOCIN) 50 MG capsule TAKE ONE (1) CAPSULE (50 MG) BY MOUTH TWO (2) TIMES DAILY 60 capsule 1       Allergies   Allergen Reactions    Lisinopril Angioedema     Lip swelling on 2/23/24 while on lisinopril 10mg daily    Wasp Venom Protein Other (See Comments)     Swelling and soreness.     Seasonal Allergies         Social History     Tobacco Use    Smoking status: Never     Passive exposure: Never    Smokeless tobacco: Never   Substance Use Topics    Alcohol use: Yes     Alcohol/week: 28.0 standard drinks of alcohol     Types: 28 Standard drinks or equivalent per week     Comment: 4 drinks a day.     Family history  No family history of bleeding disorders, blood clotting disorders, or anesthesia reactions      History   Drug Use Unknown             Review of Systems  Constitutional, HEENT, cardiovascular, pulmonary, GI, , musculoskeletal, neuro, skin, endocrine and psych systems are negative, except as otherwise noted.    Objective    /89 (BP Location: Right arm, Patient Position: Sitting, Cuff Size: Adult Regular)   Pulse 78   Temp 98  F (36.7  C) (Oral)   Resp 20   Ht 1.915 m (6' 3.39\")   Wt 102.1 kg (225 lb)   SpO2 98%   BMI 27.83 kg/m     Estimated body mass index is 27.83 kg/m  as calculated from the following:    Height as of this encounter: 1.915 m (6' 3.39\").    Weight as of this encounter: 102.1 kg (225 lb).  General: Alert no distress.  Relates his " own history easily  HEENT: Has normocephalic atraumatic.  Erythematous skin along the facial cheeks and nose unchanged.  Sclera nonicteric noninjected.  Pupils equal.  Tympanic membranes normal.  Cardiovascular: Regular rate and rhythm without murmur  Respiratory: Clear to auscultation bilaterally  Abdomen: Soft and nontender  Extremities Free of edema  Intact bilateral leg hip flexion, hip extension, knee flexion, knee extension, ankle dorsiflexion, ankle plantarflexion.  Subtle strength differences would not be picked up on this seated brief exam.    Twelve-lead EKG reviewed: Normal sinus rhythm.  No ST or T wave changes.  I personally reviewed the EKG on the date of service.

## 2025-05-13 ENCOUNTER — RESULTS FOLLOW-UP (OUTPATIENT)
Dept: GASTROENTEROLOGY | Facility: CLINIC | Age: 65
End: 2025-05-13

## 2025-05-13 LAB
ANION GAP SERPL CALCULATED.3IONS-SCNC: 10 MMOL/L (ref 7–15)
BUN SERPL-MCNC: 15 MG/DL (ref 8–23)
CALCIUM SERPL-MCNC: 9.4 MG/DL (ref 8.8–10.4)
CHLORIDE SERPL-SCNC: 103 MMOL/L (ref 98–107)
CREAT SERPL-MCNC: 0.9 MG/DL (ref 0.67–1.17)
EGFRCR SERPLBLD CKD-EPI 2021: >90 ML/MIN/1.73M2
GLUCOSE SERPL-MCNC: 93 MG/DL (ref 70–99)
HCO3 SERPL-SCNC: 25 MMOL/L (ref 22–29)
POTASSIUM SERPL-SCNC: 4.9 MMOL/L (ref 3.4–5.3)
SODIUM SERPL-SCNC: 138 MMOL/L (ref 135–145)

## 2025-05-13 NOTE — RESULT ENCOUNTER NOTE
Your kidney function, electrolytes, hepatitis C (liver test) and HIV screen and hemoglobin were all NORMAL.  No further blood testing is required prior to your procedure

## 2025-06-15 ENCOUNTER — HEALTH MAINTENANCE LETTER (OUTPATIENT)
Age: 65
End: 2025-06-15

## (undated) DEVICE — Device

## (undated) DEVICE — CATH EP WOVENFLEXIE QUAD 5FRX110CM REPRO SYK200597

## (undated) DEVICE — PATCH CARTO 3 EXTERNAL REFERENCE 3D MAPPING CREFP6

## (undated) DEVICE — INTRO SHEATH 8FRX10CM PINNACLE RSS802

## (undated) DEVICE — INTRODUCER SHEATH VASC CATH 8.5FR CARTO GIDE STH MED D138502

## (undated) DEVICE — ELECTRODE DEFIB CADENCE 22550R

## (undated) DEVICE — CUSTOM PACK EP

## (undated) DEVICE — CATH EP DECAPOLAR CS 7FR BI-DIRECTL FJ

## (undated) DEVICE — TUBE SET SMARKABLATE IRRIGATION

## (undated) DEVICE — 8F SOUNDSTAR ECO ULTRASOUND CATHETER

## (undated) DEVICE — CATHETER OCTARAY LONG SPLINE CURVE F 3-3-3-3-3 D160906

## (undated) DEVICE — GUIDEWIRE PROTRACK PGTL .025IN DIA 23

## (undated) DEVICE — NEEDLE 71CM NRG TRANSSEPTAL C0  8F FIX CURVE NRG-E-HF-71-C0

## (undated) DEVICE — INTRO SHEATH 9FRX10CM PINNACLE RSS902

## (undated) DEVICE — CATH THERMOCOOL SMARTTOUCH SF FJ CURVE

## (undated) RX ORDER — PHENYLEPHRINE HYDROCHLORIDE 10 MG/ML
INJECTION INTRAVENOUS
Status: DISPENSED
Start: 2023-10-27

## (undated) RX ORDER — DEXAMETHASONE SODIUM PHOSPHATE 10 MG/ML
INJECTION, SOLUTION INTRAMUSCULAR; INTRAVENOUS
Status: DISPENSED
Start: 2023-10-27

## (undated) RX ORDER — FENTANYL CITRATE-0.9 % NACL/PF 10 MCG/ML
PLASTIC BAG, INJECTION (ML) INTRAVENOUS
Status: DISPENSED
Start: 2023-10-27

## (undated) RX ORDER — BUPIVACAINE HYDROCHLORIDE 5 MG/ML
INJECTION, SOLUTION EPIDURAL; INTRACAUDAL
Status: DISPENSED
Start: 2023-10-27

## (undated) RX ORDER — ISOPROTERENOL HYDROCHLORIDE 0.2 MG/ML
INJECTION, SOLUTION INTRAVENOUS
Status: DISPENSED
Start: 2023-10-27

## (undated) RX ORDER — ONDANSETRON 2 MG/ML
INJECTION INTRAMUSCULAR; INTRAVENOUS
Status: DISPENSED
Start: 2023-10-27

## (undated) RX ORDER — ADENOSINE 3 MG/ML
INJECTION, SOLUTION INTRAVENOUS
Status: DISPENSED
Start: 2023-10-27

## (undated) RX ORDER — FENTANYL CITRATE 50 UG/ML
INJECTION, SOLUTION INTRAMUSCULAR; INTRAVENOUS
Status: DISPENSED
Start: 2023-10-27

## (undated) RX ORDER — HEPARIN SODIUM 10000 [USP'U]/100ML
INJECTION, SOLUTION INTRAVENOUS
Status: DISPENSED
Start: 2023-10-27

## (undated) RX ORDER — PROPOFOL 10 MG/ML
INJECTION, EMULSION INTRAVENOUS
Status: DISPENSED
Start: 2023-10-27

## (undated) RX ORDER — PROTAMINE SULFATE 10 MG/ML
INJECTION, SOLUTION INTRAVENOUS
Status: DISPENSED
Start: 2023-10-27